# Patient Record
Sex: MALE | Race: WHITE | Employment: FULL TIME | URBAN - METROPOLITAN AREA
[De-identification: names, ages, dates, MRNs, and addresses within clinical notes are randomized per-mention and may not be internally consistent; named-entity substitution may affect disease eponyms.]

---

## 2019-03-05 ENCOUNTER — OFFICE VISIT (OUTPATIENT)
Dept: ORTHOPEDIC SURGERY | Age: 44
End: 2019-03-05
Payer: COMMERCIAL

## 2019-03-05 ENCOUNTER — TELEPHONE (OUTPATIENT)
Dept: ORTHOPEDIC SURGERY | Age: 44
End: 2019-03-05

## 2019-03-05 VITALS
DIASTOLIC BLOOD PRESSURE: 99 MMHG | HEART RATE: 59 BPM | HEIGHT: 72 IN | BODY MASS INDEX: 31.83 KG/M2 | SYSTOLIC BLOOD PRESSURE: 155 MMHG | WEIGHT: 235 LBS

## 2019-03-05 DIAGNOSIS — S69.92XA INJURY OF LEFT THUMB, INITIAL ENCOUNTER: Primary | ICD-10-CM

## 2019-03-05 PROCEDURE — 99203 OFFICE O/P NEW LOW 30 MIN: CPT | Performed by: ORTHOPAEDIC SURGERY

## 2019-03-08 ENCOUNTER — TELEPHONE (OUTPATIENT)
Dept: ORTHOPEDIC SURGERY | Age: 44
End: 2019-03-08

## 2019-03-11 ENCOUNTER — TELEPHONE (OUTPATIENT)
Dept: ORTHOPEDIC SURGERY | Age: 44
End: 2019-03-11

## 2019-03-13 ENCOUNTER — OFFICE VISIT (OUTPATIENT)
Dept: ORTHOPEDIC SURGERY | Age: 44
End: 2019-03-13
Payer: COMMERCIAL

## 2019-03-13 DIAGNOSIS — S63.642A RUPTURE OF ULNAR COLLATERAL LIGAMENT OF LEFT THUMB, INITIAL ENCOUNTER: Primary | ICD-10-CM

## 2019-03-13 DIAGNOSIS — S69.92XA INJURY OF LEFT THUMB, INITIAL ENCOUNTER: ICD-10-CM

## 2019-03-13 DIAGNOSIS — S53.32XA STENER LESION OF LEFT THUMB, INITIAL ENCOUNTER: ICD-10-CM

## 2019-03-13 PROCEDURE — 99213 OFFICE O/P EST LOW 20 MIN: CPT | Performed by: ORTHOPAEDIC SURGERY

## 2019-03-14 RX ORDER — METOPROLOL SUCCINATE 50 MG/1
50 TABLET, EXTENDED RELEASE ORAL DAILY
COMMUNITY

## 2019-03-14 SDOH — HEALTH STABILITY: MENTAL HEALTH: HOW OFTEN DO YOU HAVE A DRINK CONTAINING ALCOHOL?: NEVER

## 2019-03-19 ENCOUNTER — ANESTHESIA EVENT (OUTPATIENT)
Dept: OPERATING ROOM | Age: 44
End: 2019-03-19
Payer: COMMERCIAL

## 2019-03-19 ENCOUNTER — HOSPITAL ENCOUNTER (OUTPATIENT)
Age: 44
Setting detail: OUTPATIENT SURGERY
Discharge: HOME | End: 2019-03-19
Attending: ORTHOPAEDIC SURGERY | Admitting: ORTHOPAEDIC SURGERY
Payer: COMMERCIAL

## 2019-03-19 ENCOUNTER — ANESTHESIA (OUTPATIENT)
Dept: OPERATING ROOM | Age: 44
End: 2019-03-19
Payer: COMMERCIAL

## 2019-03-19 VITALS — OXYGEN SATURATION: 96 % | TEMPERATURE: 98.1 F | SYSTOLIC BLOOD PRESSURE: 97 MMHG | DIASTOLIC BLOOD PRESSURE: 56 MMHG

## 2019-03-19 VITALS
TEMPERATURE: 97.4 F | SYSTOLIC BLOOD PRESSURE: 119 MMHG | DIASTOLIC BLOOD PRESSURE: 89 MMHG | HEIGHT: 72 IN | OXYGEN SATURATION: 93 % | BODY MASS INDEX: 32.51 KG/M2 | RESPIRATION RATE: 14 BRPM | WEIGHT: 240 LBS | HEART RATE: 61 BPM

## 2019-03-19 DIAGNOSIS — S63.642A RUPTURE OF ULNAR COLLATERAL LIGAMENT OF LEFT THUMB, INITIAL ENCOUNTER: Primary | ICD-10-CM

## 2019-03-19 PROCEDURE — 2709999900 HC NON-CHARGEABLE SUPPLY: Performed by: ORTHOPAEDIC SURGERY

## 2019-03-19 PROCEDURE — 7100000011 HC PHASE II RECOVERY - ADDTL 15 MIN: Performed by: ORTHOPAEDIC SURGERY

## 2019-03-19 PROCEDURE — 6360000002 HC RX W HCPCS: Performed by: NURSE ANESTHETIST, CERTIFIED REGISTERED

## 2019-03-19 PROCEDURE — 2500000003 HC RX 250 WO HCPCS: Performed by: NURSE ANESTHETIST, CERTIFIED REGISTERED

## 2019-03-19 PROCEDURE — 2500000003 HC RX 250 WO HCPCS: Performed by: ORTHOPAEDIC SURGERY

## 2019-03-19 PROCEDURE — 7100000000 HC PACU RECOVERY - FIRST 15 MIN: Performed by: ORTHOPAEDIC SURGERY

## 2019-03-19 PROCEDURE — C1776 JOINT DEVICE (IMPLANTABLE): HCPCS | Performed by: ORTHOPAEDIC SURGERY

## 2019-03-19 PROCEDURE — 3600000004 HC SURGERY LEVEL 4 BASE: Performed by: ORTHOPAEDIC SURGERY

## 2019-03-19 PROCEDURE — 6360000002 HC RX W HCPCS: Performed by: ANESTHESIOLOGY

## 2019-03-19 PROCEDURE — 7100000010 HC PHASE II RECOVERY - FIRST 15 MIN: Performed by: ORTHOPAEDIC SURGERY

## 2019-03-19 PROCEDURE — 6370000000 HC RX 637 (ALT 250 FOR IP): Performed by: ORTHOPAEDIC SURGERY

## 2019-03-19 PROCEDURE — 6360000002 HC RX W HCPCS: Performed by: ORTHOPAEDIC SURGERY

## 2019-03-19 PROCEDURE — 7100000001 HC PACU RECOVERY - ADDTL 15 MIN: Performed by: ORTHOPAEDIC SURGERY

## 2019-03-19 PROCEDURE — 3700000001 HC ADD 15 MINUTES (ANESTHESIA): Performed by: ORTHOPAEDIC SURGERY

## 2019-03-19 PROCEDURE — 3600000014 HC SURGERY LEVEL 4 ADDTL 15MIN: Performed by: ORTHOPAEDIC SURGERY

## 2019-03-19 PROCEDURE — 2580000003 HC RX 258: Performed by: NURSE ANESTHETIST, CERTIFIED REGISTERED

## 2019-03-19 PROCEDURE — 3700000000 HC ANESTHESIA ATTENDED CARE: Performed by: ORTHOPAEDIC SURGERY

## 2019-03-19 PROCEDURE — 2580000003 HC RX 258: Performed by: ORTHOPAEDIC SURGERY

## 2019-03-19 PROCEDURE — 6370000000 HC RX 637 (ALT 250 FOR IP): Performed by: ANESTHESIOLOGY

## 2019-03-19 DEVICE — KIT CONVENIENCE HND WRST INTERNALBRACE LIG AUG IMPL REP SYS: Type: IMPLANTABLE DEVICE | Site: HAND | Status: FUNCTIONAL

## 2019-03-19 RX ORDER — SODIUM CHLORIDE 9 MG/ML
INJECTION, SOLUTION INTRAVENOUS CONTINUOUS
Status: DISCONTINUED | OUTPATIENT
Start: 2019-03-19 | End: 2019-03-19 | Stop reason: HOSPADM

## 2019-03-19 RX ORDER — SODIUM CHLORIDE, SODIUM LACTATE, POTASSIUM CHLORIDE, CALCIUM CHLORIDE 600; 310; 30; 20 MG/100ML; MG/100ML; MG/100ML; MG/100ML
INJECTION, SOLUTION INTRAVENOUS CONTINUOUS
Status: DISCONTINUED | OUTPATIENT
Start: 2019-03-19 | End: 2019-03-19 | Stop reason: HOSPADM

## 2019-03-19 RX ORDER — LIDOCAINE HYDROCHLORIDE 10 MG/ML
0.5 INJECTION, SOLUTION EPIDURAL; INFILTRATION; INTRACAUDAL; PERINEURAL ONCE
Status: DISCONTINUED | OUTPATIENT
Start: 2019-03-19 | End: 2019-03-19 | Stop reason: HOSPADM

## 2019-03-19 RX ORDER — GINSENG 100 MG
CAPSULE ORAL
Status: COMPLETED | OUTPATIENT
Start: 2019-03-19 | End: 2019-03-19

## 2019-03-19 RX ORDER — MAGNESIUM HYDROXIDE 1200 MG/15ML
LIQUID ORAL CONTINUOUS PRN
Status: COMPLETED | OUTPATIENT
Start: 2019-03-19 | End: 2019-03-19

## 2019-03-19 RX ORDER — DEXAMETHASONE SODIUM PHOSPHATE 4 MG/ML
INJECTION, SOLUTION INTRA-ARTICULAR; INTRALESIONAL; INTRAMUSCULAR; INTRAVENOUS; SOFT TISSUE PRN
Status: DISCONTINUED | OUTPATIENT
Start: 2019-03-19 | End: 2019-03-19 | Stop reason: SDUPTHER

## 2019-03-19 RX ORDER — ONDANSETRON 2 MG/ML
4 INJECTION INTRAMUSCULAR; INTRAVENOUS
Status: DISCONTINUED | OUTPATIENT
Start: 2019-03-19 | End: 2019-03-19 | Stop reason: HOSPADM

## 2019-03-19 RX ORDER — FENTANYL CITRATE 50 UG/ML
INJECTION, SOLUTION INTRAMUSCULAR; INTRAVENOUS PRN
Status: DISCONTINUED | OUTPATIENT
Start: 2019-03-19 | End: 2019-03-19 | Stop reason: SDUPTHER

## 2019-03-19 RX ORDER — HYDROCODONE BITARTRATE AND ACETAMINOPHEN 5; 325 MG/1; MG/1
1 TABLET ORAL EVERY 6 HOURS PRN
Qty: 15 TABLET | Refills: 0 | Status: SHIPPED | OUTPATIENT
Start: 2019-03-19 | End: 2019-03-24

## 2019-03-19 RX ORDER — LIDOCAINE HYDROCHLORIDE 10 MG/ML
1 INJECTION, SOLUTION EPIDURAL; INFILTRATION; INTRACAUDAL; PERINEURAL
Status: DISCONTINUED | OUTPATIENT
Start: 2019-03-19 | End: 2019-03-19 | Stop reason: HOSPADM

## 2019-03-19 RX ORDER — HYDROMORPHONE HCL 110MG/55ML
0.5 PATIENT CONTROLLED ANALGESIA SYRINGE INTRAVENOUS EVERY 5 MIN PRN
Status: DISCONTINUED | OUTPATIENT
Start: 2019-03-19 | End: 2019-03-19 | Stop reason: HOSPADM

## 2019-03-19 RX ORDER — PROPOFOL 10 MG/ML
INJECTION, EMULSION INTRAVENOUS PRN
Status: DISCONTINUED | OUTPATIENT
Start: 2019-03-19 | End: 2019-03-19 | Stop reason: SDUPTHER

## 2019-03-19 RX ORDER — ONDANSETRON 2 MG/ML
INJECTION INTRAMUSCULAR; INTRAVENOUS PRN
Status: DISCONTINUED | OUTPATIENT
Start: 2019-03-19 | End: 2019-03-19 | Stop reason: SDUPTHER

## 2019-03-19 RX ORDER — HYDROCODONE BITARTRATE AND ACETAMINOPHEN 5; 325 MG/1; MG/1
1 TABLET ORAL
Status: COMPLETED | OUTPATIENT
Start: 2019-03-19 | End: 2019-03-19

## 2019-03-19 RX ORDER — EPHEDRINE SULFATE 50 MG/ML
INJECTION INTRAVENOUS PRN
Status: DISCONTINUED | OUTPATIENT
Start: 2019-03-19 | End: 2019-03-19 | Stop reason: SDUPTHER

## 2019-03-19 RX ORDER — MIDAZOLAM HYDROCHLORIDE 1 MG/ML
INJECTION INTRAMUSCULAR; INTRAVENOUS PRN
Status: DISCONTINUED | OUTPATIENT
Start: 2019-03-19 | End: 2019-03-19 | Stop reason: SDUPTHER

## 2019-03-19 RX ORDER — SODIUM CHLORIDE, SODIUM LACTATE, POTASSIUM CHLORIDE, CALCIUM CHLORIDE 600; 310; 30; 20 MG/100ML; MG/100ML; MG/100ML; MG/100ML
INJECTION, SOLUTION INTRAVENOUS CONTINUOUS PRN
Status: DISCONTINUED | OUTPATIENT
Start: 2019-03-19 | End: 2019-03-19 | Stop reason: SDUPTHER

## 2019-03-19 RX ORDER — HYDROMORPHONE HCL 110MG/55ML
0.25 PATIENT CONTROLLED ANALGESIA SYRINGE INTRAVENOUS EVERY 5 MIN PRN
Status: DISCONTINUED | OUTPATIENT
Start: 2019-03-19 | End: 2019-03-19 | Stop reason: HOSPADM

## 2019-03-19 RX ORDER — LIDOCAINE HYDROCHLORIDE 20 MG/ML
INJECTION, SOLUTION EPIDURAL; INFILTRATION; INTRACAUDAL; PERINEURAL PRN
Status: DISCONTINUED | OUTPATIENT
Start: 2019-03-19 | End: 2019-03-19 | Stop reason: SDUPTHER

## 2019-03-19 RX ORDER — CEFAZOLIN SODIUM 2 G/100ML
2 INJECTION, SOLUTION INTRAVENOUS
Status: COMPLETED | OUTPATIENT
Start: 2019-03-19 | End: 2019-03-19

## 2019-03-19 RX ORDER — BUPIVACAINE HYDROCHLORIDE 2.5 MG/ML
INJECTION, SOLUTION EPIDURAL; INFILTRATION; INTRACAUDAL
Status: COMPLETED | OUTPATIENT
Start: 2019-03-19 | End: 2019-03-19

## 2019-03-19 RX ADMIN — PROPOFOL 200 MG: 10 INJECTION, EMULSION INTRAVENOUS at 14:52

## 2019-03-19 RX ADMIN — SODIUM CHLORIDE, POTASSIUM CHLORIDE, SODIUM LACTATE AND CALCIUM CHLORIDE: 600; 310; 30; 20 INJECTION, SOLUTION INTRAVENOUS at 13:32

## 2019-03-19 RX ADMIN — LIDOCAINE HYDROCHLORIDE 100 MG: 20 INJECTION, SOLUTION EPIDURAL; INFILTRATION; INTRACAUDAL; PERINEURAL at 14:50

## 2019-03-19 RX ADMIN — MIDAZOLAM HYDROCHLORIDE 2 MG: 1 INJECTION, SOLUTION INTRAMUSCULAR; INTRAVENOUS at 14:44

## 2019-03-19 RX ADMIN — CEFAZOLIN SODIUM 2 G: 2 INJECTION, SOLUTION INTRAVENOUS at 14:43

## 2019-03-19 RX ADMIN — EPHEDRINE SULFATE 10 MG: 50 INJECTION, SOLUTION INTRAVENOUS at 15:28

## 2019-03-19 RX ADMIN — FENTANYL CITRATE 25 MCG: 50 INJECTION, SOLUTION INTRAMUSCULAR; INTRAVENOUS at 15:12

## 2019-03-19 RX ADMIN — HYDROCODONE BITARTRATE AND ACETAMINOPHEN 1 TABLET: 5; 325 TABLET ORAL at 17:30

## 2019-03-19 RX ADMIN — SODIUM CHLORIDE, POTASSIUM CHLORIDE, SODIUM LACTATE AND CALCIUM CHLORIDE: 600; 310; 30; 20 INJECTION, SOLUTION INTRAVENOUS at 14:16

## 2019-03-19 RX ADMIN — HYDROMORPHONE HYDROCHLORIDE 0.5 MG: 2 INJECTION, SOLUTION INTRAMUSCULAR; INTRAVENOUS; SUBCUTANEOUS at 17:11

## 2019-03-19 RX ADMIN — HYDROMORPHONE HYDROCHLORIDE 0.5 MG: 2 INJECTION, SOLUTION INTRAMUSCULAR; INTRAVENOUS; SUBCUTANEOUS at 17:00

## 2019-03-19 RX ADMIN — ONDANSETRON 4 MG: 2 INJECTION INTRAMUSCULAR; INTRAVENOUS at 15:00

## 2019-03-19 RX ADMIN — DEXAMETHASONE SODIUM PHOSPHATE 8 MG: 4 INJECTION, SOLUTION INTRAMUSCULAR; INTRAVENOUS at 14:58

## 2019-03-19 ASSESSMENT — PULMONARY FUNCTION TESTS
PIF_VALUE: 4
PIF_VALUE: 3
PIF_VALUE: 4
PIF_VALUE: 2
PIF_VALUE: 4
PIF_VALUE: 5
PIF_VALUE: 4
PIF_VALUE: 4
PIF_VALUE: 8
PIF_VALUE: 0
PIF_VALUE: 4
PIF_VALUE: 1
PIF_VALUE: 4
PIF_VALUE: 21
PIF_VALUE: 4
PIF_VALUE: 5
PIF_VALUE: 4
PIF_VALUE: 4
PIF_VALUE: 2
PIF_VALUE: 4
PIF_VALUE: 2
PIF_VALUE: 4
PIF_VALUE: 2
PIF_VALUE: 4
PIF_VALUE: 3
PIF_VALUE: 16
PIF_VALUE: 1
PIF_VALUE: 4
PIF_VALUE: 18
PIF_VALUE: 17
PIF_VALUE: 1
PIF_VALUE: 3
PIF_VALUE: 4
PIF_VALUE: 1
PIF_VALUE: 4
PIF_VALUE: 3
PIF_VALUE: 3
PIF_VALUE: 4
PIF_VALUE: 3
PIF_VALUE: 6
PIF_VALUE: 4
PIF_VALUE: 4
PIF_VALUE: 3
PIF_VALUE: 4
PIF_VALUE: 2
PIF_VALUE: 4
PIF_VALUE: 5
PIF_VALUE: 1
PIF_VALUE: 4
PIF_VALUE: 3
PIF_VALUE: 4
PIF_VALUE: 5
PIF_VALUE: 4
PIF_VALUE: 1
PIF_VALUE: 0
PIF_VALUE: 4
PIF_VALUE: 5
PIF_VALUE: 5
PIF_VALUE: 10
PIF_VALUE: 2
PIF_VALUE: 4
PIF_VALUE: 5
PIF_VALUE: 4

## 2019-03-19 ASSESSMENT — PAIN - FUNCTIONAL ASSESSMENT: PAIN_FUNCTIONAL_ASSESSMENT: 0-10

## 2019-03-19 ASSESSMENT — PAIN SCALES - GENERAL
PAINLEVEL_OUTOF10: 5
PAINLEVEL_OUTOF10: 7
PAINLEVEL_OUTOF10: 7

## 2019-04-01 ENCOUNTER — TELEPHONE (OUTPATIENT)
Dept: ORTHOPEDIC SURGERY | Age: 44
End: 2019-04-01

## 2019-04-03 ENCOUNTER — HOSPITAL ENCOUNTER (OUTPATIENT)
Dept: OCCUPATIONAL THERAPY | Age: 44
Setting detail: THERAPIES SERIES
Discharge: HOME | End: 2019-04-03
Payer: COMMERCIAL

## 2019-04-03 ENCOUNTER — OFFICE VISIT (OUTPATIENT)
Dept: ORTHOPEDIC SURGERY | Age: 44
End: 2019-04-03

## 2019-04-03 DIAGNOSIS — S53.32XA STENER LESION OF LEFT THUMB, INITIAL ENCOUNTER: Primary | ICD-10-CM

## 2019-04-03 DIAGNOSIS — S63.642A RUPTURE OF ULNAR COLLATERAL LIGAMENT OF LEFT THUMB, INITIAL ENCOUNTER: ICD-10-CM

## 2019-04-03 PROCEDURE — 99024 POSTOP FOLLOW-UP VISIT: CPT | Performed by: ORTHOPAEDIC SURGERY

## 2019-04-03 PROCEDURE — L3808 WHFO, RIGID W/O JOINTS: HCPCS | Performed by: OCCUPATIONAL THERAPIST

## 2019-04-03 NOTE — PLAN OF CARE
[]Left  Occupational/Vocational Status:   Progress of any previous OT/PT: the patient []has/ [x]has not received OT/PT previously for this diagnosis. Pain: 2/10    Objective Findings:  Stitches removed today at MD appointment  Type of splint:  L thumb spica FA based  Splint protocol utilization:  At all times except when cleansing skin  Splint Purpose: [x]Immobilize or protect [x]Promote healing of ligament   [x]Relieve pain  []Provide support for improved hand function []Maximize joint motion    Treatment:   [x]Splint provided ([x]Customized/ []Prefabricated), and splint rationale explained. [x]Patient instructed in [x]wear/ [x]care of splint and educated regarding diagnosis. [x]Patient instructed in symptom reduction techniques   []HEP instruction    []Discussed ADL assistive device    Written Information Distributed: []HEP  [x]Splint care and wearing protocol    Patient response to evaluation and instructions:  [x]Attentive/interested   [x]Asked questions/ retained info  []Appeared disinterested  []Poor retention of information  []Appeared anxious/ fearful    Assessment and Plan:  Goals: [x]Patient will be able to verbalize rationale for, and demonstrate proper wearing     of splint. [x]Splint will provide proper fit and function. [x]Patient will be able to verbalize 2-3 ways to prevent further symptoms. [x]Patient will be able to don and doff independently. []Patient will be independent with HEP    Goals met:  [x]yes []no    Plan:  []Splint completed with good fit and function. Hand Therapy to follow up for     splint modifications as needed    [x]Splint completed; OT/PT evaluation initiated. Patient to return for further     treatment.     Tobin Scott  OTR/L [address] [facility]  [place] [id number]
Anxious

## 2019-04-03 NOTE — PROGRESS NOTES
Chief Complaint:  Follow-up (left thumb)      History of Present of Illness:  Mr. Yuly Boone is a 58-year-old male presenting his 1st postoperative visit after left thumb surgery  Procedure: Repair of left thumb MP joint ulnar collateral ligament  Date of procedure: 3/19/2019  He is now 2 weeks status post surgery  Overall his thumb pain has been minimal, he has been in a dressing since his injury  Unfortunately he did have a skin reaction about 3 days after his surgery. He noticed a significant amount of itching and skin reaction. He was given a course of oral prednisone at an urgent care clinic in over the last several days he has noticed overall improvement. He denies any chest pain or difficulty with breathing. No fever chills or other constitutional symptoms  Examination:  General: Pleasant, well-appearing, no acute distress  Left upper extremity:  There is some excoriation and dried skin throughout the forearm down to the wrist with a rash appearing diffusely without evidence of obvious open wounds, no fluctuance and no induration  Left thumb wound appears to be well healed with sutures in place, well approximated wound  Normal thumb tenodesis and cascade with active thumb EPL and FPL with overall stiffness throughout thumb MP and IP joint  Sensation grossly intact throughout the radial and ulnar aspect of thumb including on the dorsal ulnar aspect of thumb  Capillary refill brisk in thumb tip      Radiology:     X-rays obtained and reviewed in office:  Views 3  Location left thumb  Impression appropriate alignment of thumb MP joint with no evidence of subluxation  No fracture with cortical tunnels within metacarpal head and base of proximal phalanx in appropriate position    Orders Placed This Encounter   Procedures    XR FINGER LEFT (MIN 2 VIEWS)       Impression:  Encounter Diagnoses   Name Primary?     Stener lesion of left thumb, initial encounter Yes    Rupture of ulnar collateral ligament of left thumb, initial encounter          Treatment Plan:  's patient is now 2 weeks status post surgery  We will plan to remove sutures today and apply Steri-Strips noted okay for normal hand hygiene starting in 24 hours  OT referral for fabrication of forearm based thumb spica with IP joint free  We will initiate active range of motion of thumb and wrist with flexion and extension avoiding any lateral pinch or stress of ulnar collateral ligament at this time.   I did discuss with the patient removing the splint for hygiene as well as for exercises but to keep on otherwise for protection  We will plan to see him back in 3-4 weeks for repeat evaluation as he progresses his range of motion with therapy, no strengthening formally until follow-up

## 2019-04-03 NOTE — LETTER
[facility]  [address] [address]  Phone: [phone]  Fax: [phone]    Adriana Scott MD        April 3, 2019     Patient: Moose Chavez   YOB: 1975   Date of Visit: 4/3/2019       To Whom It May Concern: It is my medical opinion that Hope Coles may return to light duty immediately with the following restrictions: no work involving left hand. Restrictions are in effect for 4 weeks. If you have any questions or concerns, please don't hesitate to call.     Sincerely,      Vimal Vazquez MD

## 2019-04-10 ENCOUNTER — HOSPITAL ENCOUNTER (OUTPATIENT)
Dept: OCCUPATIONAL THERAPY | Age: 44
Setting detail: THERAPIES SERIES
Discharge: HOME | End: 2019-04-10
Payer: COMMERCIAL

## 2019-04-10 PROCEDURE — 97140 MANUAL THERAPY 1/> REGIONS: CPT | Performed by: OCCUPATIONAL THERAPIST

## 2019-04-10 PROCEDURE — 97165 OT EVAL LOW COMPLEX 30 MIN: CPT | Performed by: OCCUPATIONAL THERAPIST

## 2019-04-10 PROCEDURE — 97110 THERAPEUTIC EXERCISES: CPT | Performed by: OCCUPATIONAL THERAPIST

## 2019-04-10 NOTE — FLOWSHEET NOTE
[facility], [address] [address]  Phone [phone]      Fax [phone]    Hand Therapy Daily Treatment Note  Date:  4/10/2019    Patient: Dominik Pat   : 1975   MRN: [id number]  Referring Physician: Referring Practitioner: Yane Mitchell       Medical Diagnosis Information:  Diagnosis: L thumb UCL rupture  E31.478H    L thumb Stener lesion S53. 32XA    Treatment Diagnosis: L thumb/hand stiffness M25.642                                         Insurance information: OT Insurance Information: [facility]   16 visits  Date of Injury: 19  Date of Surgery: 3/19/19      Visit # Insurance Allowable   1 16   19     Date of Patient follow up with Physician:    Afshin:  OT G-marcus  Functional Assessment Tool Used: Quick DASH   Score: 52%    Progress Note: []  Yes  [x]  No  Next due by: Visit #10      Latex Allergy:  [x]NO      []YES    Preferred Language for Healthcare:   [x]English       []other:    Pain level:  2/10     SUBJECTIVE:    History of Injury/ Mechanism of Injury: 19  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 Manual Scar massage    Retrograde massage                                                                   Therapeutic Exercise and NMR:  [x] (45549) Provided verbal/tactile cueing for activities related to strengthening, flexibility, endurance, ROM  for improvements in scapular, scapulothoracic and UE control with self care, reaching, carrying, lifting, house/yardwork, driving/computer work.     [x] Splinting:  [] Fabrication of:   [] (06703) Checkout for orthotic/prosthetic use, established patient   [] (78709) Orthotic management and training (fitting and assessment)  [] Comments:    Charges:  Timed Code Treatment Minutes: 25   Total Treatment Minutes: 50     [x] EVAL (LOW) 61210   [] OT Re-eval (39343)  [] EVAL (MOD) 49177   [] EVAL (HIGH) 54353       [x] Stephen (51421) x  1   [] DRBYQ(58274)  [] NMR (73750) x      [] Estim (attended) (54395)   [x] Manual ([id number]) x  1    [] US (66284)  [] TA (91061) x      [] Paraffin (38314)  [] ADL  (26755) x     [] Splint/L code:    [] Estim (unattended) [phone] 93 31)  [] Other:      Frequency/Duration:  1-2 days per week for 8 weeks         GOALS:  Short Term Goals: To be achieved in: 2 weeks  1. Independent in HEP and progression per patient tolerance, in order to prevent re-injury. 2. Patient will have a decrease in pain to facilitate improvement in movement, function, and ADLs as indicated by Functional Deficits.     Long Term Goals to be achieved in 8  weeks, including patient directed goals to address identified performance deficits:  1) Pt to be independent in graded HEP progression with a good level of effort and compliance. 2) Pt to report a score of 52 % or less on the Quick DASH disability questionnaire for increased performance with carrying, moving, and handling objects. 3) Pt will demonstrate increased ROM to L th tip to [facility] </= 2 cm for improved performance with fastening, opening containers,. 4) Pt will demonstrate increased strength to L  within 12# of R and 3 pt pinch within 5# of R for improved performance with opening containers, lifting, and performing job requirements. 5) Pt will have a decrease in pain to 2/1 with moderate resistive tasks to facilitate improvement in performance ADL and work tasks.         Progression Towards Functional goals:  [] Patient is progressing as expected towards functional goals listed.     [] Progression is slowed due to complexities listed. [] Progression has been slowed due to co-morbidities. [x] Plan just implemented, too soon to assess goals progression  [] Other:     ASSESSMENT:  See eval    Treatment/Activity Tolerance:  [] Patient tolerated treatment well [] Patient limited by fatique  [x] Patient limited by pain  [] Patient limited by other medical complications  [] Other:     Prognosis: [x] Good [] Fair  [] Poor    Patient Requires Follow-up: [x] Yes  [] No    PLAN: See eval  [] Continue per plan of care [] Alter current plan (see comments)  [x] Plan of care initiated [] Hold pending MD visit [] Discharge    Electronically signed by:  Elías Hammond OTR/L [address] [place] [address]  [place] [id number]

## 2019-04-10 NOTE — PLAN OF CARE
[address] [place] [address] [place] [address]  Phone [phone]      Fax [phone]      Occupational Therapy/Hand Therapy Certification  Dear Referring Practitioner: Andrew Jones,     We had the pleasure of evaluating the following patient for occupational therapy services at [address] [place] [address]. A summary of our findings can be found in the initial assessment below. This includes our plan of care. If you have any questions or concerns regarding these findings, please do not hesitate to contact me at the office phone number checked above. Thank you for the referral.     Physician Signature:_______________________________Date:__________________  By signing above (or electronic signature), therapists plan is approved by physician      Patient: Hermila Vasquez   : 1975   MRN: [id number]  Referring Physician: Referring Practitioner: Andrew Jonse      Evaluation Date: 4/10/2019      Medical Diagnosis Information:  Diagnosis: L thumb UCL rupture  P44.746M    L thumb Stener lesion S53. 32XA    Treatment Diagnosis: L thumb/hand stiffness M25.642                  Insurance information: OT Insurance Information: [address] [place] [address]   16 visits                                                                                               4/3/19-19  Date of Injury: 19  Date of Surgery: 3/19/19      Precautions/ Contra-indications:   Latex Allergy:  [x]No      []Yes  Pacemaker:  [x] No       [] Yes     Preferred Language for Healthcare:   [x]English       []other:      G-Codes:  OT G-codes  Functional Assessment Tool Used: Quick DASH   Score: 52%    [x] Patient reported history, allergies, and medications reviewed - see intake form.      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    Prior Level of Function: [x] Independent with ADLs/IADLs     [] Assistance needed (describe):    Patient-Identified Primary Performance Deficits (to be addressed in POC):   [] bathing    [] household tasks (cooking/cleaning)   [x] dressing - fastening    [] self feeding   [] grooming    [x] work/education- light duty   [] functional mobility   [] sleeping/rest   [] toileting/hygiene   [] recreational activities   [] driving    [] community/social participation   [x] other: cutting food, opening containers, lifting      Comorbidities Affecting Functional Performance:     []Anxiety (F41.9)/Depression (F32.9)   []Diabetes Type 1(E10.65) or 2 (E11.65)   []Rheumatoid Arthritis (M05.9)  []Fibromyalgia (M79.7)  []Neuropathy(G60.9)  []Osteoarthritis(M19.91)  []None   [x]Other:HTN    Hand Dominance:   [x]  Right    [] Left      OBJECTIVE:        AROM: Right Left   IF MP  PIP  DIP       LF MP  PIP  DIP       RF MP  PIP   DIP       SF MP  PIP  DIP       Digits: tips to DPFC           Thumb MP  IP  /35  /20   Thumb tip to DPFC  6 cm   Thumb opposition  IF, LF   Thumb RA               PA  60   Wrist Ext/Flex            RD/UD  60/70   Forearm sup/pron       Elbow ext/flex       Shoulder Flex                   Abd                   IR/ER          Edema:       Mild to mod        Strength:  deferred    II     Lateral Pinch     3 Point Pinch     Tip Pinch     MMT:            Observations (including splints, bandages, incisions, scars): scar well healed     Sensation: [] No reported deficits  [] Intact to light touch    [] Orrington Heather test completed, findings as noted:  [x] Other:Reports decreased acuteness of sensation    Palpation: unremarkable    Functional Mobility/Transfers/Gait: [x] Independent - no significant gait deviations  [] Assistance needed   [] Assistive device used: Falls Risk Assessment (30 days):   [x] Falls Risk assessed and no intervention required. [] Falls Risk assessed and Patient requires intervention due to being higher risk   TUG score (>12s at risk):     [] Falls education provided, including      Review Of Systems (ROS): [x]Performed Review of systems (Integumentary, CardioPulmonary, Neurological) by intake and observation. Intake form has been scanned into medical record. Patient has been instructed to contact their primary care physician regarding ROS issues if not already being addressed at this time. ASSESSMENT:   This patient presents with signs and symptoms consistent with the medical diagnosis provided by the referring physician. Impairments (physical, cognitive and/or psychosocial):  [x] Decreased mobility   [x] Weakness    [] Hypersensitivity   [x] Pain/tenderness   [] Edema/swelling   [x] Decreased coordination (fine/gross motor)   [] Impaired body mechanics  [] Sensory loss  [] Loss of balance   [] Other:      Performance Deficits (to be addressed in plan of care):   [] Bathing    [] Household Tasks (cooking/cleaning)   [x] Dressing - fastenings    [] Self Feeding   [] Grooming    [x] Work/Education -on light duty   [] Functional Mobility   [] Sleeping/Rest   [] Toileting/Hygiene   [] Recreational Activities   [] Driving    [] Community/Social Participation   [x] Other:cutting food, opening containers, lifting     Rehab Potential:   [] Excellent [x] Good [] Fair  [] Poor     Barriers affecting rehab potential:  []Age    []Lack of Motivation   []Co-Morbidities  []Cognitive Function  []Environmental/home/work barriers  []Other:     Tolerance of evaluation/treatment:    [] Excellent [x] Good [] Fair  [] Poor      PLAN OF CARE:  Interventions:   [x] Therapeutic Exercise [x] Therapeutic Activity    [x] Activities of Daily Living [x] Neuromuscular Re-education      [x] Patient Education  [x] Manual Therapy      [x] Modalities as needed, and not otherwise contraindicated, including: ultrasound,paraffin,moist heat/cold pack, electrical stimulation, contrast bath, iontophoresis  [] Splinting    Frequency/Duration:  1-2 days per week for 8 weeks       GOALS:  Short Term Goals: To be achieved in: 2 weeks  1. Independent in HEP and progression per patient tolerance, in order to prevent re-injury. 2. Patient will have a decrease in pain to facilitate improvement in movement, function, and ADLs as indicated by Functional Deficits. Long Term Goals to be achieved in 8  weeks, including patient directed goals to address identified performance deficits:  1) Pt to be independent in graded HEP progression with a good level of effort and compliance. 2) Pt to report a score of 52 % or less on the Quick DASH disability questionnaire for increased performance with carrying, moving, and handling objects. 3) Pt will demonstrate increased ROM to L th tip to [facility] </= 2 cm for improved performance with fastening, opening containers,. 4) Pt will demonstrate increased strength to L  within 12# of R and 3 pt pinch within 5# of R for improved performance with opening containers, lifting, and performing job requirements. 5) Pt will have a decrease in pain to 2/1 with moderate resistive tasks to facilitate improvement in performance ADL and work tasks.       OCCUPATIONAL THERAPY EVALUATION COMPLEXITY JUSTIFICATION:    [] An occupational profile and medical/therapy history, which includes:   [x] a brief history including medical and/or therapy records relating to the     presenting problem   [] an expanded review of medical and/or therapy records and additional review     of physical, cognitive or psychosocial history related to current functional    performance   [] an extensive additional review of review of medical and/or therapy records   and physical, cognitive, or psychosocial history related to current    functional performance    [] An assessment that identifies performance deficits (relating to physical, cognitive, or psychosocial skills) that result in activity limitations and/or participation restrictions:   [] 1-3 performance deficits   [x] 3-5 performance deficits   [] 5 or more performance deficits    [] Clinical decision making of:   [x] low complexity, including analysis of occupational profile, data analysis from problem focused assessment, and consideration of a limited number of treatment options. No comorbidities affect occupational performance. No task modifications or assistance needed to complete evaluation. [] moderate complexity, including analysis of occupational profile, data analysis from detailed assessment and consideration of several treatment options. Comorbidities that affect occupational performance may be present. Minimal to moderate task modifications or assistance needed to complete assessment. [] high complexity, including analysis of occupational profile, analysis of data from comprehensive assessment and consideration of multiple treatment options. Multiple comorbidities present that affect occupational performance. Significant task modifications or assistance needed to complete assessment. Evaluation Code:  [x] Low Complexity EVAL 91137 (typically 30 minutes face to face)  [] Mod Complexity EVAL 62629 (typically 45 minutes face to face)  [] High Complexity EVAL 98761 (typically 60 minutes face to face)             Electronically signed by:   Toibn Scott OTR/L [address] [place] [address]  [place] [id number]

## 2019-04-17 ENCOUNTER — HOSPITAL ENCOUNTER (OUTPATIENT)
Dept: OCCUPATIONAL THERAPY | Age: 44
Setting detail: THERAPIES SERIES
Discharge: HOME | End: 2019-04-17
Payer: COMMERCIAL

## 2019-04-17 ENCOUNTER — OFFICE VISIT (OUTPATIENT)
Dept: ORTHOPEDIC SURGERY | Age: 44
End: 2019-04-17

## 2019-04-17 DIAGNOSIS — S63.642A RUPTURE OF ULNAR COLLATERAL LIGAMENT OF LEFT THUMB, INITIAL ENCOUNTER: Primary | ICD-10-CM

## 2019-04-17 DIAGNOSIS — S53.32XA STENER LESION OF LEFT THUMB, INITIAL ENCOUNTER: ICD-10-CM

## 2019-04-17 PROCEDURE — 97110 THERAPEUTIC EXERCISES: CPT | Performed by: OCCUPATIONAL THERAPIST

## 2019-04-17 PROCEDURE — 97140 MANUAL THERAPY 1/> REGIONS: CPT | Performed by: OCCUPATIONAL THERAPIST

## 2019-04-17 PROCEDURE — 97022 WHIRLPOOL THERAPY: CPT | Performed by: OCCUPATIONAL THERAPIST

## 2019-04-17 PROCEDURE — 99024 POSTOP FOLLOW-UP VISIT: CPT | Performed by: ORTHOPAEDIC SURGERY

## 2019-04-17 NOTE — PROGRESS NOTES
Chief Complaint:  Hand Pain (L HAND )      History of Present of Illness:  Mr. Jose Siddiqi is a 45-year-old male presenting his repeat scheduled postoperative visit after left thumb surgery  Procedure: Repair of left thumb MP joint ulnar collateral ligament  Date of procedure: 3/19/2019  He is now proximally 1 month status post surgery  He reports to be doing well, making progress with his thumb MP and IP joint range of motion  He does have some soreness in his thumb after occupational therapy and a small patch of numbness near the dorsal ulnar aspect of the thumb that has been minimally noticeable  No new swelling otherwise in no other complaints today      Examination:  General: Pleasant, well-appearing, no acute distress  Left upper extremity:  Well-healed incision with mobile and soft scar, no evidence of surrounding skin changes or erythema    Normal thumb tenodesis and cascade with active thumb EPL and FPL with overall stiffness in all planes compared with contralateral thumb but overall MP and IP joint remained range of motion smooth with no crepitus  Sensation grossly intact throughout the radial and ulnar aspect of thumb except for a small area approximately 1 cm just distal to the incision at the dorsal ulnar aspect of thumb  Capillary refill brisk in thumb tip        Radiology:     X-rays obtained and reviewed in office:  No new images obtained today    No orders of the defined types were placed in this encounter. Impression:  Encounter Diagnoses   Name Primary?  Rupture of ulnar collateral ligament of left thumb, initial encounter Yes    Stener lesion of left thumb, initial encounter          Treatment Plan:  The patient appears to be healing well.  Continue with therapy for range of motion, initiate some light pinching and gripping starting in 2 weeks  He will continue to wear his brace when out and about for an additional 2 weeks for protection and then may wean from the brace thereafter  Only strengthening guided by therapy until follow-up in approximately one month  We did discuss monitoring his area of altered sensation and I do expect this to improve but we did discuss likely neuropraxia with a small area of numbness that the patient only notices when applying scar massage

## 2019-04-17 NOTE — FLOWSHEET NOTE
[facility], [address] [address]  Phone [phone]      Fax [phone]    Hand Therapy Daily Treatment Note  Date:  2019    Patient: Nikolas Rowell   : 1975   MRN: [id number]  Referring Physician: Referring Practitioner: Glendy Rhoades       Medical Diagnosis Information:  Diagnosis: L thumb UCL rupture  M96.520B    L thumb Stener lesion S53. 32XA    Treatment Diagnosis: L thumb/hand stiffness M25.642                                         Insurance information: OT Insurance Information: [facility]   16 visits  Date of Injury: 19  Date of Surgery: 3/19/19      Visit # Insurance Allowable   2 16   19     Date of Patient follow up with Physician:  1 month    G-Codes:  OT G-codes  Functional Assessment Tool Used: Quick DASH   Score: 52%    Progress Note: []  Yes  [x]  No  Next due by: Visit #10      Latex Allergy:  [x]NO      []YES    Preferred Language for Healthcare:   [x]English       []other:    Pain level:  2/10 Current:3/10 mainly with exer    SUBJECTIVE:  Pain just when he exercises. Now able to oppose ring and small fingers        Frequency/Duration:  1-2 days per week for 8 weeks         GOALS:  Short Term Goals: To be achieved in: 2 weeks  1. Independent in HEP and progression per patient tolerance, in order to prevent re-injury. 2. Patient will have a decrease in pain to facilitate improvement in movement, function, and ADLs as indicated by Functional Deficits.     Long Term Goals to be achieved in 8  weeks, including patient directed goals to address identified performance deficits:  1) Pt to be independent in graded HEP progression with a good level of effort and compliance. 2) Pt to report a score of 52 % or less on the Quick DASH disability questionnaire for increased performance with carrying, moving, and handling objects.   3) Pt will demonstrate increased ROM to L th tip to balance, coordination, kinesthetic sense, posture, motor skill, proprioception  to assist with  scapular, scapulothoracic and UE control with self care, reaching, carrying, lifting, house/yardwork, driving/computer work.   [] Comments:    Therapeutic Activities:    [] (78177 or 90005) Provided verbal/tactile cueing for activities related to improving balance, coordination, kinesthetic sense, posture, motor skill, proprioception and motor activation to allow for proper function of scapular, scapulothoracic and UE control with self care, carrying, lifting, driving/computer work  [] Comments:    Home Exercise Program:    [x] (93713) Reviewed/Progressed HEP activities related to strengthening, flexibility, endurance, ROM of scapular, scapulothoracic and UE control with self care, reaching, carrying, lifting, house/yardwork, driving/computer work  [] (76571) Reviewed/Progressed HEP activities related to improving balance, coordination, kinesthetic sense, posture, motor skill, proprioception of scapular, scapulothoracic and UE control with self care, reaching, carrying, lifting, house/yardwork, driving/computer work    [x] Comments: handouts    Manual Treatments:  PROM / STM / Oscillations-Mobs:  G-I, II, III, IV (PA's, Inf., Post.)  [x] (22850) Provided manual therapy to mobilize soft tissue/joints of cervical/CT, scapular GHJ and UE for the purpose of modulating pain, promoting relaxation,  increasing ROM, reducing/eliminating soft tissue swelling/inflammation/restriction, improving soft tissue extensibility and allowing for proper ROM for normal function with self care, reaching, carrying, lifting, house/yardwork, driving/computer work  [] Comments:    ADL Training:  []  (42847) Provided self-care/home management training related to activities of daily living and compensatory training, and/or use of adaptive equipment   [] Comments:     Splinting:  [] Fabrication of:   [] (58929) Checkout for orthotic/prosthetic use, established patient   [] (50486) Orthotic management and training (fitting and assessment)  [] Comments:    Charges:  Timed Code Treatment Minutes: 45   Total Treatment Minutes: 60     [] EVAL (LOW) 24634   [] OT Re-eval (01540)  [] EVAL (MOD) 94689   [] EVAL (HIGH) 27886       [x] Stephen (78046) x  2   [] PDBLG(47251)  [] NMR (96295) x      [] Estim (attended) (38782)   [x] Manual ([id number]) x  1    [] US (21599)  [] TA (04569) x      [] Paraffin (70691)  [] ADL  ([phone]) x     [] Splint/L code:    [] Estim (unattended) 33 93 31)  [x] Other:fluidotherapy    Frequency/Duration:  1-2 days per week for 8 weeks         GOALS:  Short Term Goals: To be achieved in: 2 weeks  1. Independent in HEP and progression per patient tolerance, in order to prevent re-injury. 2. Patient will have a decrease in pain to facilitate improvement in movement, function, and ADLs as indicated by Functional Deficits.     Long Term Goals to be achieved in 8  weeks, including patient directed goals to address identified performance deficits:  1) Pt to be independent in graded HEP progression with a good level of effort and compliance. 2) Pt to report a score of 52 % or less on the Quick DASH disability questionnaire for increased performance with carrying, moving, and handling objects. 3) Pt will demonstrate increased ROM to L th tip to [facility] </= 2 cm for improved performance with fastening, opening containers,. 4) Pt will demonstrate increased strength to L  within 12# of R and 3 pt pinch within 5# of R for improved performance with opening containers, lifting, and performing job requirements. 5) Pt will have a decrease in pain to 2/1 with moderate resistive tasks to facilitate improvement in performance ADL and work tasks.         Progression Towards Functional goals:  [] Patient is progressing as expected towards functional goals listed. [] Progression is slowed due to complexities listed.   [] Progression has been slowed due to co-morbidities. [x] Plan just implemented, too soon to assess goals progression  [] Other:     ASSESSMENT:  See eval    Treatment/Activity Tolerance:  [] Patient tolerated treatment well [] Patient limited by fatique  [] Patient limited by pain  [] Patient limited by other medical complications  [] Other:     Prognosis: [x] Good [] Fair  [] Poor    Patient Requires Follow-up: [x] Yes  [] No    PLAN: Progress per protocol and MD recomendations  [x] Continue per plan of care [] Alter current plan (see comments)  [] Plan of care initiated [] Hold pending MD visit [] Discharge    Electronically signed by:  Leonel Zapata OTR/L [place]  OT [id number]

## 2019-04-24 ENCOUNTER — HOSPITAL ENCOUNTER (OUTPATIENT)
Dept: OCCUPATIONAL THERAPY | Age: 44
Setting detail: THERAPIES SERIES
Discharge: HOME | End: 2019-04-24
Payer: COMMERCIAL

## 2019-04-24 PROCEDURE — 97763 ORTHC/PROSTC MGMT SBSQ ENC: CPT | Performed by: OCCUPATIONAL THERAPIST

## 2019-04-24 PROCEDURE — 97140 MANUAL THERAPY 1/> REGIONS: CPT | Performed by: OCCUPATIONAL THERAPIST

## 2019-04-24 PROCEDURE — 97110 THERAPEUTIC EXERCISES: CPT | Performed by: OCCUPATIONAL THERAPIST

## 2019-04-24 NOTE — FLOWSHEET NOTE
[facility], [employer]  [address] [place] [address]  Phone [phone]      Fax [phone]    Hand Therapy Daily Treatment Note  Date:  2019    Patient: Fredi Meyers   : 1975   MRN: [id number]  Referring Physician: Referring Practitioner: Pineda Hylton       Medical Diagnosis Information:  Diagnosis: L thumb UCL rupture  L16.969S    L thumb Stener lesion S53. 32XA    Treatment Diagnosis: L thumb/hand stiffness M25.642                                         Insurance information: OT Insurance Information: [facility]   16 visits  Date of Injury: 19  Date of Surgery: 3/19/19      Visit # Insurance Allowable   3 16   19     Date of Patient follow up with Physician:  1 month    G-Codes:  OT G-codes  Functional Assessment Tool Used: Quick DASH   Score: 19 34% ( [id number]%)    Progress Note: []  Yes  [x]  No  Next due by: Visit #10      Latex Allergy:  [x]NO      []YES    Preferred Language for Healthcare:   [x]English       []other:    Pain level:  2/10 Current:3/10 mainly with exer    SUBJECTIVE:  Pain with IP flex of thumb .      RESTRICTIONS/PRECAUTIONS:   19 MD appointment :     Continue with therapy for range of motion, initiate some light pinching and gripping starting in 2 weeks  He will continue to wear his brace when out and about for an additional 2 weeks for protection and then may wean from the brace thereafter  Only strengthening guided by therapy until follow-up in approximately one month    OBJECTIVE:     Date: 4/10/19 4/17/19      Objective Measures:  4 weeks      See eval        Th   MP  IP L  /35  /20 L  /40  /25    R  0/60  +10/85          Th tip tp DPFC 6 cm 5    R .5cm      Strength   deferred                    Modalities: 4/10/19   4/17/19 4/24/19  5 wks     HP 10'  paraffin with th flex stretch gripping , light pinching    Fluidotherapy  12'                              Therapeutic Exercise & Activities:        AROM gentle  Blocked IP and MP  Th flex   Th abd  Th opp. AAROM  Blocked IP  Th flex  10Xea    AROM  Th abd  Th opp AAROM  Blocked IP  Th flex  10Xea     gripping , light pinching    Manual Scar massage    Retrograde massage Scar massage    Added vibration Scar massage     vibration    IP jt mobs     Activities/function  Nut and bolt  10X    Th flex with lg and sm foam cubes placed on ulnar side of [facility]    Golf balls manipulattion within hand Th flex with sm foam cubes placed on ulnar side of [facility]      thumbciser  1 light band  15X3    Th abd with foam cube    Yellow putty   Gentle , th flex, th add, th flex                                                         Therapeutic Exercise and NMR:  [x] (52723) Provided verbal/tactile cueing for activities related to strengthening, flexibility, endurance, ROM  for improvements in scapular, scapulothoracic and UE control with self care, reaching, carrying, lifting, house/yardwork, driving/computer work. [x] (68350) Provided verbal/tactile cueing for activities related to improving balance, coordination, kinesthetic sense, posture, motor skill, proprioception  to assist with  scapular, scapulothoracic and UE control with self care, reaching, carrying, lifting, house/yardwork, driving/computer work.   [] Comments:    Therapeutic Activities:    [] (03933 or 56129) Provided verbal/tactile cueing for activities related to improving balance, coordination, kinesthetic sense, posture, motor skill, proprioception and motor activation to allow for proper function of scapular, scapulothoracic and UE control with self care, carrying, lifting, driving/computer work  [] Comments:    Home Exercise Program:    [x] (19511) Reviewed/Progressed HEP activities related to strengthening, flexibility, endurance, ROM of scapular, scapulothoracic and UE control with self care, reaching, carrying, lifting, house/yardwork, driving/computer work  [] (59223) Reviewed/Progressed HEP activities related to improving balance, coordination, kinesthetic sense, posture, motor skill, proprioception of scapular, scapulothoracic and UE control with self care, reaching, carrying, lifting, house/yardwork, driving/computer work    [x] Comments: handouts    Manual Treatments:  PROM / STM / Oscillations-Mobs:  G-I, II, III, IV (PA's, Inf., Post.)  [x] (86522) Provided manual therapy to mobilize soft tissue/joints of cervical/CT, scapular GHJ and UE for the purpose of modulating pain, promoting relaxation,  increasing ROM, reducing/eliminating soft tissue swelling/inflammation/restriction, improving soft tissue extensibility and allowing for proper ROM for normal function with self care, reaching, carrying, lifting, house/yardwork, driving/computer work  [] Comments:    ADL Training:  []  (54219) Provided self-care/home management training related to activities of daily living and compensatory training, and/or use of adaptive equipment   [] Comments:     Splinting:  [] Fabrication of:   [] (51089) Checkout for orthotic/prosthetic use, established patient   [] (04146) Orthotic management and training (fitting and assessment)  [x] Comments:added IP flex loop to splint. Pt. To use 3 x's per day for 10 minutes to assist with AROM. Orthotic management and training subsequent encounter    Charges:  Timed Code Treatment Minutes:  40   Total Treatment Minutes: 65   2:15 - 3:20  [] EVAL (LOW) 98666   [] OT Re-eval (87228)  [] EVAL (MOD) 26424   [] EVAL (HIGH) 78816       [x] Stephen (76721) x  2  25 [] VZNGU(42327)  [] NMR (01782) x      [] Estim (attended) (95059)   [x] Manual (17899) x  1  15  [] US (78019)  [] TA (08905) x      [] Paraffin (57692)  [] ADL  (57025) x     [] Splint/L code:    [] Estim (unattended) (07775)  [x] Other:splint modification  Subsequent encounter    Frequency/Duration:  1-2 days per week for 8 weeks         GOALS:  Short Term Goals: To be achieved in: 2 weeks  1. Independent in HEP and progression per patient tolerance, in order to prevent re-injury. 2. Patient will have a decrease in pain to facilitate improvement in movement, function, and ADLs as indicated by Functional Deficits.     Long Term Goals to be achieved in 8  weeks, including patient directed goals to address identified performance deficits:  1) Pt to be independent in graded HEP progression with a good level of effort and compliance. 2) Pt to report a score of 52 % or less on the Quick DASH disability questionnaire for increased performance with carrying, moving, and handling objects. 3) Pt will demonstrate increased ROM to L th tip to [facility] </= 2 cm for improved performance with fastening, opening containers,. 4) Pt will demonstrate increased strength to L  within 12# of R and 3 pt pinch within 5# of R for improved performance with opening containers, lifting, and performing job requirements. 5) Pt will have a decrease in pain to 2/1 with moderate resistive tasks to facilitate improvement in performance ADL and work tasks.         Progression Towards Functional goals:  [x] Patient is progressing as expected towards functional goals listed. [] Progression is slowed due to complexities listed. [] Progression has been slowed due to co-morbidities. [] Plan just implemented, too soon to assess goals progression  [] Other:     ASSESSMENT:  Pt felt passive flex of thumb IP joint was helpful. Added IP flex loop to th spica splint. Pt.  To use 2 x's per day for 10 minutes     Treatment/Activity Tolerance:  [] Patient tolerated treatment well [] Patient limited by fatique  [x] Patient limited by pain  [] Patient limited by other medical complications  [] Other:     Prognosis: [x] Good [] Fair  [] Poor    Patient Requires Follow-up: [x] Yes  [] No    PLAN: Progress per protocol and MD recomendations  [x] Continue per plan of care [] Alter current plan (see comments)  [] Plan of care initiated [] Hold

## 2019-05-02 ENCOUNTER — HOSPITAL ENCOUNTER (OUTPATIENT)
Dept: OCCUPATIONAL THERAPY | Age: 44
Setting detail: THERAPIES SERIES
Discharge: HOME | End: 2019-05-02
Payer: COMMERCIAL

## 2019-05-02 PROCEDURE — 97112 NEUROMUSCULAR REEDUCATION: CPT | Performed by: OCCUPATIONAL THERAPIST

## 2019-05-02 PROCEDURE — 97530 THERAPEUTIC ACTIVITIES: CPT | Performed by: OCCUPATIONAL THERAPIST

## 2019-05-02 PROCEDURE — 97140 MANUAL THERAPY 1/> REGIONS: CPT | Performed by: OCCUPATIONAL THERAPIST

## 2019-05-02 NOTE — FLOWSHEET NOTE
[facility], [address] [address]  Phone [phone]      Fax [phone]    Hand Therapy Daily Treatment Note  Date:  2019    Patient: Kinsey Wilson   : 1975   MRN: [id number]  Referring Physician: Referring Practitioner: Lio Greene       Medical Diagnosis Information:  Diagnosis: L thumb UCL rupture  O14.599T    L thumb Stener lesion S53. 32XA    Treatment Diagnosis: L thumb/hand stiffness M25.642                                         Insurance information: OT Insurance Information: [facility]   16 visits  Date of Injury: 19  Date of Surgery: 3/19/19      Visit # Insurance Allowable   4 16   19     Date of Patient follow up with Physician:  1 month    G-Codes:  OT G-codes  Functional Assessment Tool Used: Quick DASH   Score: 19 34% ( [id number]%)    Progress Note: []  Yes  [x]  No  Next due by: Visit #10      Latex Allergy:  [x]NO      []YES    Preferred Language for Healthcare:   [x]English       []other:    Pain level:  2/10 Current:3/10 mainly with exer    SUBJECTIVE:  Thumb is doing better. Moves better when it is warm. Still having some nerve issues. Temperature feels different on different parts of thumb.       RESTRICTIONS/PRECAUTIONS:   19 MD appointment :     Continue with therapy for range of motion, initiate some light pinching and gripping starting in 2 weeks  He will continue to wear his brace when out and about for an additional 2 weeks for protection and then may wean from the brace thereafter  Only strengthening guided by therapy until follow-up in approximately one month    OBJECTIVE:     Date: 4/10/19 4/17/19 5/2/19     Objective Measures:  4 weeks      See eval        Th   MP  IP L  /35  /20 L  /40  /25    R  0/60  +10/85       0/60  0/60     Th tip tp DPFC 6 cm 5    R .5cm      Strength   deferred    R 110#  L - 65#        Lateral Pinch  R - 20  L - 11 Provided verbal/tactile cueing for activities related to improving balance, coordination, kinesthetic sense, posture, motor skill, proprioception and motor activation to allow for proper function of scapular, scapulothoracic and UE control with self care, carrying, lifting, driving/computer work  [] Comments:    Home Exercise Program:    [x] (53914) Reviewed/Progressed HEP activities related to strengthening, flexibility, endurance, ROM of scapular, scapulothoracic and UE control with self care, reaching, carrying, lifting, house/yardwork, driving/computer work  [] (86904) Reviewed/Progressed HEP activities related to improving balance, coordination, kinesthetic sense, posture, motor skill, proprioception of scapular, scapulothoracic and UE control with self care, reaching, carrying, lifting, house/yardwork, driving/computer work    [x] Comments: handouts    Manual Treatments:  PROM / STM / Oscillations-Mobs:  G-I, II, III, IV (PA's, Inf., Post.)  [x] (22890) Provided manual therapy to mobilize soft tissue/joints of cervical/CT, scapular GHJ and UE for the purpose of modulating pain, promoting relaxation,  increasing ROM, reducing/eliminating soft tissue swelling/inflammation/restriction, improving soft tissue extensibility and allowing for proper ROM for normal function with self care, reaching, carrying, lifting, house/yardwork, driving/computer work  [] Comments:    ADL Training:  []  (43913) Provided self-care/home management training related to activities of daily living and compensatory training, and/or use of adaptive equipment   [] Comments:     Splinting:  [] Fabrication of:   [] (66134) Checkout for orthotic/prosthetic use, established patient   [] (83872) Orthotic management and training (fitting and assessment)  [] Comments:added IP flex loop to splint. Pt. To use 3 x's per day for 10 minutes to assist with AROM.   Orthotic management and training subsequent encounter    Charges:  Timed Code Treatment Minutes:  40   Total Treatment Minutes: 50     [] EVAL (LOW) 02809   [] OT Re-eval (77561)  [] EVAL (MOD) 22884   [] EVAL (HIGH) 32978       [x] Stephen (84678) x  1  315-325 [] BHBCI(72354)  [] NMR (14676) x      [] Estim (attended) (20097)   [x] Manual (08504) x  1  325-340 [] US (98372)  [x] TA (45410) x  1  340-355 [] Paraffin (47366)  [] ADL  (81213) x     [] Splint/L code:    [] Estim (unattended) (67062)  [x] Other:splint modification  Subsequent encounter    Frequency/Duration:  1-2 days per week for 8 weeks         GOALS:  Short Term Goals: To be achieved in: 2 weeks  1. Independent in HEP and progression per patient tolerance, in order to prevent re-injury. 2. Patient will have a decrease in pain to facilitate improvement in movement, function, and ADLs as indicated by Functional Deficits.     Long Term Goals to be achieved in 8  weeks, including patient directed goals to address identified performance deficits:  1) Pt to be independent in graded HEP progression with a good level of effort and compliance. 2) Pt to report a score of 52 % or less on the Quick DASH disability questionnaire for increased performance with carrying, moving, and handling objects. 3) Pt will demonstrate increased ROM to L th tip to [facility] </= 2 cm for improved performance with fastening, opening containers,. 4) Pt will demonstrate increased strength to L  within 12# of R and 3 pt pinch within 5# of R for improved performance with opening containers, lifting, and performing job requirements. 5) Pt will have a decrease in pain to 2/1 with moderate resistive tasks to facilitate improvement in performance ADL and work tasks.         Progression Towards Functional goals:  [x] Patient is progressing as expected towards functional goals listed. [] Progression is slowed due to complexities listed. [] Progression has been slowed due to co-morbidities.   [] Plan just implemented, too soon to assess goals progression  [] Other: ASSESSMENT:  Good gains in ROM to left thumb. Tolerated resistive activities well.    Treatment/Activity Tolerance:  [x] Patient tolerated treatment well [] Patient limited by fatique  [] Patient limited by pain  [] Patient limited by other medical complications  [] Other:     Prognosis: [x] Good [] Fair  [] Poor    Patient Requires Follow-up: [x] Yes  [] No    PLAN: Progress per protocol and MD recomendations  [x] Continue per plan of care [] Alter current plan (see comments)  [] Plan of care initiated [] Hold pending MD visit [] Discharge    Electronically signed by: Kain Wheeler, [address] [place] [id number]

## 2019-05-08 ENCOUNTER — HOSPITAL ENCOUNTER (OUTPATIENT)
Dept: OCCUPATIONAL THERAPY | Age: 44
Setting detail: THERAPIES SERIES
Discharge: HOME | End: 2019-05-08
Payer: COMMERCIAL

## 2019-05-08 PROCEDURE — 97018 PARAFFIN BATH THERAPY: CPT | Performed by: OCCUPATIONAL THERAPIST

## 2019-05-08 PROCEDURE — 97110 THERAPEUTIC EXERCISES: CPT | Performed by: OCCUPATIONAL THERAPIST

## 2019-05-08 NOTE — FLOWSHEET NOTE
[facility], [address] [address]  Phone [phone]      Fax [phone]    Hand Therapy Daily Treatment Note  Date:  2019    Patient: Dominik Pat   : 1975   MRN: [id number]  Referring Physician: Referring Practitioner: Yane Mitchell       Medical Diagnosis Information:  Diagnosis: L thumb UCL rupture  G61.110I    L thumb Stener lesion S53. 32XA    Treatment Diagnosis: L thumb/hand stiffness M25.642                                         Insurance information: OT Insurance Information: [id number] [facility]   16 visits  Date of Injury: 19  Date of Surgery: 3/19/19      Visit # Insurance Allowable   4 16   19     Date of Patient follow up with Physician:  5/15/19    G-Codes:  OT G-codes  Functional Assessment Tool Used: Quick DASH   Score: 19 9% ( [id number]%)    Progress Note: []  Yes  [x]  No  Next due by: Visit #10      Latex Allergy:  [x]NO      []YES    Preferred Language for Healthcare:   [x]English       []other:    Pain level:  2/10 Current:3/10 mainly with exer    SUBJECTIVE:  Able to make fist without pain    19 Thumb is doing better. Moves better when it is warm. Still having some nerve issues. Temperature feels different on different parts of thumb.       RESTRICTIONS/PRECAUTIONS:   19 MD appointment :     Continue with therapy for range of motion, initiate some light pinching and gripping starting in 2 weeks  He will continue to wear his brace when out and about for an additional 2 weeks for protection and then may wean from the brace thereafter  Only strengthening guided by therapy until follow-up in approximately one month    OBJECTIVE:     Date: 4/10/19 4/17/19 5/2/19     Objective Measures:  4 weeks      See eval        Th   MP  IP L  /35  /20 L  /40  /25    R  0/60  +10/85       0/60  0/60     Th tip tp DPFC 6 cm 5    R .5cm      Strength   deferred    R 110#  L - 65# Lateral Pinch  R - 20  L - 11        Three Point PInch  R - 20  L - 11           Modalities: 4/10/19   4/17/19 4/24/19  5 wks 5/2/19 5/8/19   HP 10'  paraffin with th flex stretch Flexion tape to thumb with hot pack paraffinwith th flex stretch   Fluidotherapy  12'                              Therapeutic Exercise & Activities:        AROM gentle  Blocked IP and MP  Th flex   Th abd  Th opp. AAROM  Blocked IP  Th flex  10Xea    AROM  Th abd  Th opp AAROM  Blocked IP  Th flex  10Xea     PROM to left thumb. Isolated IP, MCP and composite flexion left thumb. Manual Scar massage    Retrograde massage Scar massage    Added vibration Scar massage     vibration    IP jt mobs Scar and joint maobilization    Activities/function  Nut and bolt  10X    Th flex with lg and sm foam cubes placed on ulnar side of [facility]    Golf balls manipulattion within hand Th flex with sm foam cubes placed on ulnar side of [facility]      thumbciser  1 light band  15X3    Th abd with foam cube    Yellow putty   Gentle , th flex, th add, th flex Squeezing and manipulating green sponge. Pressing thumb into green sponge. Three point pinch lengthwise into green sponge. Issued for home.     Translating various size objects in hand from distal to proximal and ulnar to radial emphasizing flexing IP of thumb across palm Th flex to  foam cubes on ulnar side of hand    Red putty  , roll, pinch, th flex, th add    Spheres  5'    Thumbciser  Med band  20X2    velcrochechers   remove with 3 pt pinch replace with blue clip using lat pinch  16x4    Red web  Th flex                                                                  Therapeutic Exercise and NMR:  [x] (11561) Provided verbal/tactile cueing for activities related to strengthening, flexibility, endurance, ROM  for improvements in scapular, scapulothoracic and UE control with self care, reaching, carrying, lifting, house/yardwork, driving/computer work.    [] (04250) Provided verbal/tactile cueing for activities related to improving balance, coordination, kinesthetic sense, posture, motor skill, proprioception  to assist with  scapular, scapulothoracic and UE control with self care, reaching, carrying, lifting, house/yardwork, driving/computer work.   [] Comments:    Therapeutic Activities:    [x] (59841 or 29008) Provided verbal/tactile cueing for activities related to improving balance, coordination, kinesthetic sense, posture, motor skill, proprioception and motor activation to allow for proper function of scapular, scapulothoracic and UE control with self care, carrying, lifting, driving/computer work  [] Comments:    Home Exercise Program:    [x] (21244) Reviewed/Progressed HEP activities related to strengthening, flexibility, endurance, ROM of scapular, scapulothoracic and UE control with self care, reaching, carrying, lifting, house/yardwork, driving/computer work  [] (42936) Reviewed/Progressed HEP activities related to improving balance, coordination, kinesthetic sense, posture, motor skill, proprioception of scapular, scapulothoracic and UE control with self care, reaching, carrying, lifting, house/yardwork, driving/computer work    [x] Comments: handouts    Manual Treatments:  PROM / STM / Oscillations-Mobs:  G-I, II, III, IV (PA's, Inf., Post.)  [x] (89537) Provided manual therapy to mobilize soft tissue/joints of cervical/CT, scapular GHJ and UE for the purpose of modulating pain, promoting relaxation,  increasing ROM, reducing/eliminating soft tissue swelling/inflammation/restriction, improving soft tissue extensibility and allowing for proper ROM for normal function with self care, reaching, carrying, lifting, house/yardwork, driving/computer work  [] Comments:    ADL Training:  []  (39745) Provided self-care/home management training related to activities of daily living and compensatory training, and/or use of adaptive equipment   [] Comments:     Splinting:  [] Fabrication of:   [] (25291) Checkout for orthotic/prosthetic use, established patient   [] (83021) Orthotic management and training (fitting and assessment)  [] Comments:added IP flex loop to splint. Pt. To use 3 x's per day for 10 minutes to assist with AROM. Orthotic management and training subsequent encounter    Charges:  Timed Code Treatment Minutes:  45   Total Treatment Minutes: 55     1:40- 2:35      [] EVAL (LOW) 02552   [] OT Re-eval (20401)  [] EVAL (MOD) 56047   [] EVAL (HIGH) 72746       [x] Stephen (03669) x  3 45'  [] XOKOP(69867)  [] NMR (96765) x      [] Estim (attended) (72717)   [] Manual (54100) x      [] EZ (92805)  [] TA (67737) x      [x] Paraffin (80869) 10 '  [] ADL  ([phone]) x     [] Splint/L code:    [] Estim (unattended) (74892)  [] Other:splint modification  Subsequent encounter    Frequency/Duration:  1-2 days per week for 8 weeks         GOALS:  Short Term Goals: To be achieved in: 2 weeks  1. Independent in HEP and progression per patient tolerance, in order to prevent re-injury. 2. Patient will have a decrease in pain to facilitate improvement in movement, function, and ADLs as indicated by Functional Deficits.     Long Term Goals to be achieved in 8  weeks, including patient directed goals to address identified performance deficits:  1) Pt to be independent in graded HEP progression with a good level of effort and compliance. 2) Pt to report a score of 52 % or less on the Quick DASH disability questionnaire for increased performance with carrying, moving, and handling objects. 3) Pt will demonstrate increased ROM to L th tip to [facility] </= 2 cm for improved performance with fastening, opening containers,. 4) Pt will demonstrate increased strength to L  within 12# of R and 3 pt pinch within 5# of R for improved performance with opening containers, lifting, and performing job requirements.   5) Pt will have a decrease in pain to 2/1 with moderate resistive tasks to facilitate improvement in performance ADL and work tasks.         Progression Towards Functional goals:  [x] Patient is progressing as expected towards functional goals listed. [] Progression is slowed due to complexities listed. [] Progression has been slowed due to co-morbidities. [] Plan just implemented, too soon to assess goals progression  [] Other:     ASSESSMENT: Cont to tolerate gradual strengthening well. Treatment/Activity Tolerance:  [x] Patient tolerated treatment well [] Patient limited by fatique  [] Patient limited by pain  [] Patient limited by other medical complications  [] Other:     Prognosis: [x] Good [] Fair  [] Poor    Patient Requires Follow-up: [x] Yes  [] No    PLAN: Progress per protocol and MD recomendations  [x] Continue per plan of care [] Alter current plan (see comments)  [] Plan of care initiated [] Hold pending MD visit [] Discharge    Electronically signed by:  [address]ner, [address]

## 2019-05-15 ENCOUNTER — HOSPITAL ENCOUNTER (OUTPATIENT)
Dept: OCCUPATIONAL THERAPY | Age: 44
Setting detail: THERAPIES SERIES
Discharge: HOME | End: 2019-05-15
Payer: COMMERCIAL

## 2019-05-15 ENCOUNTER — OFFICE VISIT (OUTPATIENT)
Dept: ORTHOPEDIC SURGERY | Age: 44
End: 2019-05-15

## 2019-05-15 DIAGNOSIS — S63.642A RUPTURE OF ULNAR COLLATERAL LIGAMENT OF LEFT THUMB, INITIAL ENCOUNTER: Primary | ICD-10-CM

## 2019-05-15 DIAGNOSIS — S69.92XA INJURY OF LEFT THUMB, INITIAL ENCOUNTER: ICD-10-CM

## 2019-05-15 PROCEDURE — 97110 THERAPEUTIC EXERCISES: CPT | Performed by: OCCUPATIONAL THERAPIST

## 2019-05-15 PROCEDURE — 99024 POSTOP FOLLOW-UP VISIT: CPT | Performed by: ORTHOPAEDIC SURGERY

## 2019-05-15 NOTE — FLOWSHEET NOTE
house/yardwork, driving/computer work. [x] (01341) Provided verbal/tactile cueing for activities related to improving balance, coordination, kinesthetic sense, posture, motor skill, proprioception  to assist with  scapular, scapulothoracic and UE control with self care, reaching, carrying, lifting, house/yardwork, driving/computer work.   [] Comments:    Therapeutic Activities:    [x] (61219 or 66164) Provided verbal/tactile cueing for activities related to improving balance, coordination, kinesthetic sense, posture, motor skill, proprioception and motor activation to allow for proper function of scapular, scapulothoracic and UE control with self care, carrying, lifting, driving/computer work  [] Comments:    Home Exercise Program:    [x] (79507) Reviewed/Progressed HEP activities related to strengthening, flexibility, endurance, ROM of scapular, scapulothoracic and UE control with self care, reaching, carrying, lifting, house/yardwork, driving/computer work  [] (24104) Reviewed/Progressed HEP activities related to improving balance, coordination, kinesthetic sense, posture, motor skill, proprioception of scapular, scapulothoracic and UE control with self care, reaching, carrying, lifting, house/yardwork, driving/computer work    [x] Comments: handouts    Manual Treatments:  PROM / STM / Oscillations-Mobs:  G-I, II, III, IV (PA's, Inf., Post.)  [] (61556) Provided manual therapy to mobilize soft tissue/joints of cervical/CT, scapular GHJ and UE for the purpose of modulating pain, promoting relaxation,  increasing ROM, reducing/eliminating soft tissue swelling/inflammation/restriction, improving soft tissue extensibility and allowing for proper ROM for normal function with self care, reaching, carrying, lifting, house/yardwork, driving/computer work  [] Comments:    ADL Training:  []  (30304) Provided self-care/home management training related to activities of daily living and compensatory training, and/or use of with moderate resistive tasks to facilitate improvement in performance ADL and work tasks.         Progression Towards Functional goals:  [x] Patient is progressing as expected towards functional goals listed. [] Progression is slowed due to complexities listed. [] Progression has been slowed due to co-morbidities. [] Plan just implemented, too soon to assess goals progression  [] Other:     ASSESSMENT: Good progress with strength     Treatment/Activity Tolerance:  [x] Patient tolerated treatment well [] Patient limited by fatique  [] Patient limited by pain  [] Patient limited by other medical complications  [] Other:     Prognosis: [x] Good [] Fair  [] Poor    Patient Requires Follow-up: [x] Yes  [] No    PLAN: Progress per protocol and MD recomendations  [x] Continue per plan of care [] Alter current plan (see comments)  [] Plan of care initiated [] Hold pending MD visit [] Discharge    Electronically signed by:  [id number] , [place], [address]

## 2019-05-28 ENCOUNTER — HOSPITAL ENCOUNTER (OUTPATIENT)
Dept: OCCUPATIONAL THERAPY | Age: 44
Setting detail: THERAPIES SERIES
Discharge: HOME | End: 2019-05-28
Payer: COMMERCIAL

## 2019-05-28 PROCEDURE — 97110 THERAPEUTIC EXERCISES: CPT | Performed by: OCCUPATIONAL THERAPIST

## 2019-05-28 NOTE — FLOWSHEET NOTE
[facility], [address] [address]  Phone [phone]      Fax [phone]    Hand Therapy Daily Treatment Note  Date:  2019    Patient: Deonte Yarbrough   : 1975   MRN: [id number]  Referring Physician: Referring Practitioner: Martin Lozano       Medical Diagnosis Information:  Diagnosis: L thumb UCL rupture  J22.327F    L thumb Stener lesion S53. 32XA    Treatment Diagnosis: L thumb/hand stiffness M25.642                                         Insurance information: OT Insurance Information: [address] [facility]   16 visits  Date of Injury: 19  Date of Surgery: 3/19/19      Visit # Insurance Allowable   5 16   19     Date of Patient follow up with Physician:  5/15/19    G-Codes:  OT G-codes  Functional Assessment Tool Used: Quick DASH   Score: 19 0% ( initial 52%)    Progress Note: []  Yes  [x]  No  Next due by: Visit #10      Latex Allergy:  [x]NO      []YES    Preferred Language for Healthcare:   [x]English       []other:    Pain level:  2/10 Current:1-210 mainly with exer    SUBJECTIVE:  Feels thumb has loosened up. No functional concerns.       RESTRICTIONS/PRECAUTIONS:   19 MD appointment :     Continue with therapy for range of motion, initiate some light pinching and gripping starting in 2 weeks  He will continue to wear his brace when out and about for an additional 2 weeks for protection and then may wean from the brace thereafter  Only strengthening guided by therapy until follow-up in approximately one month    OBJECTIVE:     Date: 4/10/19 4/17/19 5/2/19 5/15/19  8wks 19  10 wks   Objective Measures:  4 weeks      See eval        Th   MP  IP L  /35  /20 L  /40  /25    R  0/60  +10/85       0/60  0/60 L  0/60  0/75 L  0/60  0/75   Th tip tp DPFC 6 cm 5    R .5cm   0   Strength   deferred    R 110#  L - 65#     L 85# 105  L 100          Lateral Pinch  R - 20  L - 11     L 13# Lat  R 21  L 12      Three Point PInch  R - 20  L - 11     L15# 3 Pt.  R 19#  L 16#           Modalities: 4/10/19   5/2/19 5/8/19 5/15/19 5/28/19   HP 10' Flexion tape to thumb with hot pack paraffin with th flex stretch     Fluidotherapy                                Therapeutic Exercise & Activities:        AROM gentle  Blocked IP and MP  Th flex   Th abd  Th opp. PROM to left thumb. Isolated IP, MCP and composite flexion left thumb. Manual Scar massage    Retrograde massage Scar and joint mobilization      Activities/function  Squeezing and manipulating green sponge. Pressing thumb into green sponge. Three point pinch lengthwise into green sponge. Issued for home. Translating various size objects in hand from distal to proximal and ulnar to radial emphasizing flexing IP of thumb across palm Th flex to  foam cubes on ulnar side of hand    Red putty  , roll, pinch, th flex, th add    Spheres  5'    Thumbciser  Med band  20X2    velcrocheckers   remove with 3 pt pinch replace with blue clip using lat pinch  16x4    Red web  Th flex Spheres  5'    velcrocheckers  Black clip    5 lb. Red putty split    Red putty  Th add/abd  Th flex  Pinch    Red web  Th flex  Twist    Gr flexbar  Twist  RD/UD  20xea    rubberband exer  Th ext/flex  Abd/add  15x ea Gr flexbar  Twist  RD/UD  20xea                                                                  Therapeutic Exercise and NMR:  [x] (39112) Provided verbal/tactile cueing for activities related to strengthening, flexibility, endurance, ROM  for improvements in scapular, scapulothoracic and UE control with self care, reaching, carrying, lifting, house/yardwork, driving/computer work.     [x] (67160) Provided verbal/tactile cueing for activities related to improving balance, coordination, kinesthetic sense, posture, motor skill, proprioception  to assist with  scapular, scapulothoracic and UE control with self care, reaching, carrying, lifting, house/yardwork, driving/computer work. [] Comments:    Therapeutic Activities:    [x] (55932 or 60373) Provided verbal/tactile cueing for activities related to improving balance, coordination, kinesthetic sense, posture, motor skill, proprioception and motor activation to allow for proper function of scapular, scapulothoracic and UE control with self care, carrying, lifting, driving/computer work  [] Comments:    Home Exercise Program:    [x] (74379) Reviewed/Progressed HEP activities related to strengthening, flexibility, endurance, ROM of scapular, scapulothoracic and UE control with self care, reaching, carrying, lifting, house/yardwork, driving/computer work  [] (55249) Reviewed/Progressed HEP activities related to improving balance, coordination, kinesthetic sense, posture, motor skill, proprioception of scapular, scapulothoracic and UE control with self care, reaching, carrying, lifting, house/yardwork, driving/computer work    [x] Comments: handouts    Manual Treatments:  PROM / STM / Oscillations-Mobs:  G-I, II, III, IV (PA's, Inf., Post.)  [] (02922) Provided manual therapy to mobilize soft tissue/joints of cervical/CT, scapular GHJ and UE for the purpose of modulating pain, promoting relaxation,  increasing ROM, reducing/eliminating soft tissue swelling/inflammation/restriction, improving soft tissue extensibility and allowing for proper ROM for normal function with self care, reaching, carrying, lifting, house/yardwork, driving/computer work  [] Comments:    ADL Training:  []  (04736) Provided self-care/home management training related to activities of daily living and compensatory training, and/or use of adaptive equipment   [] Comments:     Splinting:  [] Fabrication of:   [] (97346) Checkout for orthotic/prosthetic use, established patient   [] (44784) Orthotic management and training (fitting and assessment)  [] Comments:added IP flex loop to splint. Pt.  To use 3 x's per day for 10 minutes to assist with AROM. Orthotic management and training subsequent encounter    Charges:  Timed Code Treatment Minutes:  60   Total Treatment Minutes: 65   2:17-  3:17      [] EVAL (LOW) 60319   [] OT Re-eval (24917)  [] EVAL (MOD) 87783   [] EVAL (HIGH) 18810       [x] Stephen (98540) x  4 60'  [] RPLIN(66482)  [] NMR (41015) x      [] Estim (attended) (31262)   [] Manual (27664) x      [] US (36952)  [] TA (34333) x      [] Paraffin (21143)   [] ADL  (02021) x     [] Splint/L code:    [] Estim (unattended) 33 [phone] 31)  [] Other:splint modification  Subsequent encounter    Frequency/Duration:  1-2 days per week for 8 weeks         GOALS:  Short Term Goals: To be achieved in: 2 weeks  1. Independent in HEP and progression per patient tolerance, in order to prevent re-injury. 2. Patient will have a decrease in pain to facilitate improvement in movement, function, and ADLs as indicated by Functional Deficits.     Long Term Goals to be achieved in 8  weeks, including patient directed goals to address identified performance deficits:  1) Pt to be independent in graded HEP progression with a good level of effort and compliance. MET  2) Pt to report a score of 52 % or less on the Quick DASH disability questionnaire for increased performance with carrying, moving, and handling objects. MET  3) Pt will demonstrate increased ROM to L th tip to [facility] </= 2 cm for improved performance with fastening, opening containers,. MET  4) Pt will demonstrate increased strength to L  within 12# of R and 3 pt pinch within 5# of R for improved performance with opening containers, lifting, and performing job requirements. MET  5) Pt will have a decrease in pain to 2/10 with moderate resistive tasks to facilitate improvement in performance ADL and work tasks. MET         Progression Towards Functional goals:  [x] Patient is progressing as expected towards functional goals listed. [] Progression is slowed due to complexities listed.   [] Progression has been slowed due to co-morbidities. [] Plan just implemented, too soon to assess goals progression  [x] Other: All goals met    ASSESSMENT: All goals met. Pt. To return to full duty work. Treatment/Activity Tolerance:  [x] Patient tolerated treatment well [] Patient limited by fatigue  [] Patient limited by pain  [] Patient limited by other medical complications  [] Other:     Prognosis: [x] Good [] Fair  [] Poor    Patient Requires Follow-up: [] Yes  [x] No    PLAN:   [] Continue per plan of care [] Alter current plan (see comments)  [] Plan of care initiated [] Hold pending MD visit [x] Discharge    Electronically signed by:  [id number] , [place], [address]

## 2019-05-30 NOTE — DISCHARGE SUMMARY
[employer] [employer]        Occupational Therapy Discharge  Date: 2019        Patient Name:  Otis Gage    :  1975  MRN: [id number]  Referring Physician: Kolton Vinson  Diagnosis:     Diagnosis: L thumb UCL rupture  Z09.163A    L thumb Stener lesion S53. 32XA                   Treatment Diagnosis: L thumb/hand stiffness M25.642                                                                ICD Code:    Practice Pattern: OT  Surgery/Conservative:   Surgery  Cobormidities :1  Total number of visits: 5   Reporting Period:   Beginning Date: 4/10/19 End Date: 19     OBJECTIVE  Test used Initial score Discharge Score   Pain Summary Numeric Scale 2/10 1-2/10   Functional questionnaire Quick DASH 52% 0%   ROM Th   MP  IP L  /35  /20 L  0/60  0/75    Th tip tp DPFC 6 cm 0 cm         Strength  65# 100#    Lateral Pinch 11# 13#    Three Point  11# 16#      Treatment to date:  [x] Therapeutic Exercise    Modalities:  [x] Therapeutic Activity             []Ultrasound             []Electrical Stimulation  [] ADL Training     [x] Paraffin  []Iontophoresis  [] Neuromuscular Re-education [x] Cold/hotpack         [x] Instruction in HEP      [x] Manual Therapy     [x] Other: fluidotherapy                       ? [] Splinting    Assessment:  [x] All Goals were achieved. [] The following goals were achieved (#'s):  [] The following goals were not achieved for the following reasons:                  Plan:  Reason for Discharge:   [x] All goals achieved    [] Patient having surgery    [] Physician discontinued therapy    [] Insurance/Financial Limitations   [] Patient did not return for follow up visits   [] Home program/1 visit only   [] No subjective or objective improvement   [] Plateaued   [] Patient was unable to adhere to the plan of care established at evaluation. [] Referred back to physician for re-evaluation and did not return.       [] Other:?       Electronically signed by:   Eve Neff  OTR/L [address] [facility]  [place] [id number]

## 2019-06-12 ENCOUNTER — OFFICE VISIT (OUTPATIENT)
Dept: ORTHOPEDIC SURGERY | Age: 44
End: 2019-06-12

## 2019-06-12 DIAGNOSIS — S53.32XA STENER LESION OF LEFT THUMB, INITIAL ENCOUNTER: ICD-10-CM

## 2019-06-12 DIAGNOSIS — S69.92XA INJURY OF LEFT THUMB, INITIAL ENCOUNTER: ICD-10-CM

## 2019-06-12 DIAGNOSIS — S63.642A RUPTURE OF ULNAR COLLATERAL LIGAMENT OF LEFT THUMB, INITIAL ENCOUNTER: Primary | ICD-10-CM

## 2019-06-12 PROCEDURE — 99024 POSTOP FOLLOW-UP VISIT: CPT | Performed by: ORTHOPAEDIC SURGERY

## 2019-06-12 NOTE — PROGRESS NOTES
Chief Complaint:  Hand Pain (L HAND )      History of Present of Illness:  Mr. Slick Whiting is a 55-year-old male presenting his repeat scheduled postoperative visit after left thumb surgery  Procedure: Repair of left thumb MP joint ulnar collateral ligament  Date of procedure: 3/19/2019  He is now nearly 3 months status post surgery  He is doing well with no interval complaints since his last visit. He has been progressing his range of motion and activity as tolerated  No additional complaints or changes reported today      Examination:  General: Pleasant, well-appearing, no acute distress  Left upper extremity:  Well-healed incision with nontender scar soft and no evidence of hypertrophy     Normal thumb tenodesis and cascade with active thumb EPL and FPL with nearly symmetrical range of motion in all planes of thumb motion at MP and IP joints    Stable thumb to ulnar collateral limit stress at full extension and 30 degrees of flexion with symmetrical laxity compared with contralateral thumb  No increased pain with stress    Sensation grossly intact throughout the radial and ulnar aspect of thumb   Capillary refill brisk in thumb tip        Radiology:     X-rays obtained and reviewed in office:  No new images obtained today        No orders of the defined types were placed in this encounter. Impression:  Encounter Diagnoses   Name Primary?  Rupture of ulnar collateral ligament of left thumb, initial encounter Yes    Injury of left thumb, initial encounter     Stener lesion of left thumb, initial encounter          Treatment Plan:  Patient appears to be doing very well today  He has demonstrated excellent recovery with regards to range of motion and has been progressing his strength and activity. He may continue with activity as tolerated. We will plan to follow him in a longer term. For his work-related injury and I will see him back in 3 months for repeat evaluation and likely imaging of his left thumb.

## 2019-07-05 ENCOUNTER — TELEPHONE (OUTPATIENT)
Dept: ORTHOPEDIC SURGERY | Age: 44
End: 2019-07-05

## 2019-09-11 ENCOUNTER — OFFICE VISIT (OUTPATIENT)
Dept: ORTHOPEDIC SURGERY | Age: 44
End: 2019-09-11
Payer: COMMERCIAL

## 2019-09-11 DIAGNOSIS — S53.32XA STENER LESION OF LEFT THUMB, INITIAL ENCOUNTER: ICD-10-CM

## 2019-09-11 DIAGNOSIS — S63.642A RUPTURE OF ULNAR COLLATERAL LIGAMENT OF LEFT THUMB, INITIAL ENCOUNTER: Primary | ICD-10-CM

## 2019-09-11 PROCEDURE — 99212 OFFICE O/P EST SF 10 MIN: CPT | Performed by: ORTHOPAEDIC SURGERY

## 2020-06-08 ENCOUNTER — OFFICE VISIT (OUTPATIENT)
Dept: ORTHOPEDIC SURGERY | Age: 45
End: 2020-06-08
Payer: COMMERCIAL

## 2020-06-08 VITALS — RESPIRATION RATE: 17 BRPM | WEIGHT: 238 LBS | HEIGHT: 72 IN | BODY MASS INDEX: 32.23 KG/M2

## 2020-06-08 PROCEDURE — 99214 OFFICE O/P EST MOD 30 MIN: CPT | Performed by: ORTHOPAEDIC SURGERY

## 2020-06-11 ENCOUNTER — HOSPITAL ENCOUNTER (OUTPATIENT)
Dept: PHYSICAL THERAPY | Age: 45
Setting detail: THERAPIES SERIES
Discharge: HOME | End: 2020-06-11
Payer: COMMERCIAL

## 2020-06-11 PROCEDURE — 97161 PT EVAL LOW COMPLEX 20 MIN: CPT

## 2020-06-11 PROCEDURE — 97140 MANUAL THERAPY 1/> REGIONS: CPT

## 2020-06-11 PROCEDURE — 97110 THERAPEUTIC EXERCISES: CPT

## 2020-06-11 NOTE — FLOWSHEET NOTE
strengthening, flexibility, endurance, ROM  for improvements in scapular, scapulothoracic and UE control with self care, reaching, carrying, lifting, house/yardwork, driving/computer work.    [] (03374) Provided verbal/tactile cueing for activities related to improving balance, coordination, kinesthetic sense, posture, motor skill, proprioception  to assist with  scapular, scapulothoracic and UE control with self care, reaching, carrying, lifting, house/yardwork, driving/computer work. Therapeutic Activities:    [] (84240 or 47781) Provided verbal/tactile cueing for activities related to improving balance, coordination, kinesthetic sense, posture, motor skill, proprioception and motor activation to allow for proper function of scapular, scapulothoracic and UE control with self care, carrying, lifting, driving/computer work.      Home Exercise Program:    [x] (55354) Reviewed/Progressed HEP activities related to strengthening, flexibility, endurance, ROM of scapular, scapulothoracic and UE control with self care, reaching, carrying, lifting, house/yardwork, driving/computer work  [] (39744) Reviewed/Progressed HEP activities related to improving balance, coordination, kinesthetic sense, posture, motor skill, proprioception of scapular, scapulothoracic and UE control with self care, reaching, carrying, lifting, house/yardwork, driving/computer work      Manual Treatments:  PROM / STM / Oscillations-Mobs:  G-I, II, III, IV (PA's, Inf., Post.)  [x] (31850) Provided manual therapy to mobilize soft tissue/joints of cervical/CT, scapular GHJ and UE for the purpose of modulating pain, promoting relaxation,  increasing ROM, reducing/eliminating soft tissue swelling/inflammation/restriction, improving soft tissue extensibility and allowing for proper ROM for normal function with self care, reaching, carrying, lifting, house/yardwork, driving/computer work    Modalities: Declined ice     Charges:  Timed Code Treatment most recent update on progress.
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
## 2020-06-19 ENCOUNTER — HOSPITAL ENCOUNTER (OUTPATIENT)
Dept: PHYSICAL THERAPY | Age: 45
Setting detail: THERAPIES SERIES
Discharge: HOME | End: 2020-06-19
Payer: COMMERCIAL

## 2020-06-19 PROCEDURE — 97140 MANUAL THERAPY 1/> REGIONS: CPT

## 2020-06-19 PROCEDURE — 97110 THERAPEUTIC EXERCISES: CPT

## 2020-06-19 NOTE — FLOWSHEET NOTE
[facility]    Physical Therapy Treatment Note/ Progress Report:     Date:  2020    Patient Name:  Saurabh Starks  \"Akhil\"  :  1975  MRN: [id number]  Medical/Treatment Diagnosis Information:  · Diagnosis: Strain of left rotator cuff capsule (S46.012A)  · Treatment Diagnosis: Left shoulder pain   Insurance/Certification information:  PT Insurance Information: Workers Compensation   Physician Information:  Referring Practitioner: Dr. Adri Urbano of care signed (Y/N):     Date of Patient follow up with Physician: 20     Progress Report: []  Yes  [x]  No     Functional Scale: QuickDASH = 23% disability  Date: 20    Date Range for reporting period:  Beginnin20  Ending:      Progress report due (10 Rx/or 30 days whichever is less): 83     Recertification due (POC duration/ or 90 days whichever is less): 20     Visit # Insurance Allowable Auth Needed   2 12 visits approved 6/10/20 - 20 [x]Yes    []No     Pain level:  3/10 at worst     SUBJECTIVE:  Pt reports that overall, shoulder is feeling better. Pt states that he has less pain when trying to reach overhead, but still has some discomfort when reaching out to the side and behind his back.      OBJECTIVE: See eval   Observation:    Test measurements:      RESTRICTIONS/PRECAUTIONS: L shoulder RTC strain; hx of L thumb sx 2018    Exercises/Interventions:   Therapeutic Ex 22' Wt / Resistance Sets/sec Reps Notes          Supine cane flexion AAROM  10\" 10    Shoulder IR/ER isometrics  10\" 10    No monies red 2 10    S/L ER 0# 2 10    TB rows/ext green 2 10    TB IR/ER red 2 10    Strap IR stretch  10\" 10                                                                                 Manual Intervention 18'       Shld /GH Mobs 10'   Inferior & posterior mobs gr III   Post Cap mobs       Thoracic/Rib manipulation       CT MT/Mobs       PROM MT 8' not progressing as expected and requires additional follow up with physician  [] Other    Prognosis for POC: [x] Good [] Fair  [] Poor    Patient requires continued skilled intervention: [x] Yes  [] No      PLAN: Progress shoulder mobility and RTC/periscapular strength as tolerated by pt. [x] Continue per plan of care [] Alter current plan (see comments)  [] Plan of care initiated [] Hold pending MD visit [] Discharge    Electronically signed by: Jeanette Dan PT     Note: If patient does not return for scheduled/recommended follow up visits, this note will serve as a discharge from care along with the most recent update on progress.

## 2020-06-23 ENCOUNTER — HOSPITAL ENCOUNTER (OUTPATIENT)
Dept: PHYSICAL THERAPY | Age: 45
Setting detail: THERAPIES SERIES
Discharge: HOME | End: 2020-06-23
Payer: COMMERCIAL

## 2020-06-23 PROCEDURE — 97112 NEUROMUSCULAR REEDUCATION: CPT

## 2020-06-23 PROCEDURE — 97140 MANUAL THERAPY 1/> REGIONS: CPT

## 2020-06-23 PROCEDURE — 97110 THERAPEUTIC EXERCISES: CPT

## 2020-06-23 NOTE — FLOWSHEET NOTE
[facility]    Physical Therapy Treatment Note/ Progress Report:     Date:  2020    Patient Name:  Saundra Lynn  \"Akhil\"  :  1975  MRN: [id number]  Medical/Treatment Diagnosis Information:  · Diagnosis: Strain of left rotator cuff capsule (S46.012A)  · Treatment Diagnosis: Left shoulder pain   Insurance/Certification information:  PT Insurance Information: Workers Compensation   Physician Information:  Referring Practitioner: Dr. Lyly Parnell of care signed (Y/N):     Date of Patient follow up with Physician: 20     Progress Report: []  Yes  [x]  No     Functional Scale: QuickDASH = 23% disability  Date: 20    Date Range for reporting period:  Beginnin20  Ending:      Progress report due (10 Rx/or 30 days whichever is less): 8/15/68     Recertification due (POC duration/ or 90 days whichever is less): 20     Visit # Insurance Allowable Auth Needed   3 12 visits approved 6/10/20 - 20 [x]Yes    []No     Pain level:  3/10 at worst     SUBJECTIVE:  Pt states that reaching behind his back is improving and he has less pain with this, but he still has pain with trying to reach for his seatbelt with L arm. Pt states that he slept in an awkward position last night with arm reaching across his body and reports having some increased stiffness in shoulder this morning.        OBJECTIVE: See eval   Observation:    Test measurements:      RESTRICTIONS/PRECAUTIONS: L shoulder RTC strain; hx of L thumb sx 2018    Exercises/Interventions:   Therapeutic Ex 30' Wt / Resistance Sets/sec Reps Notes   Pulleys  4'  Flex/abd   Supine cane ER   5\" 15    Shoulder ER stretch at corner/doorway  10\" 10    Wall slides  5\" 15       S/L ER 1# 2 10    TB rows/ext green 2 10    TB IR/ER green 2 10    Strap IR stretch  10\" 10    Prone rows/ext  2 10                                                                   Manual

## 2020-06-25 ENCOUNTER — HOSPITAL ENCOUNTER (OUTPATIENT)
Dept: PHYSICAL THERAPY | Age: 45
Setting detail: THERAPIES SERIES
Discharge: HOME | End: 2020-06-25
Payer: COMMERCIAL

## 2020-06-25 PROCEDURE — 97140 MANUAL THERAPY 1/> REGIONS: CPT

## 2020-06-25 PROCEDURE — 97112 NEUROMUSCULAR REEDUCATION: CPT

## 2020-06-25 PROCEDURE — 97110 THERAPEUTIC EXERCISES: CPT

## 2020-06-25 NOTE — FLOWSHEET NOTE
[facility]    Physical Therapy Treatment Note/ Progress Report:     Date:  2020    Patient Name:  Ganga Cabello  \"Akhil\"  :  1975  MRN: [id number]  Medical/Treatment Diagnosis Information:  · Diagnosis: Strain of left rotator cuff capsule (S46.012A)  · Treatment Diagnosis: Left shoulder pain   Insurance/Certification information:  PT Insurance Information: Workers Compensation   Physician Information:  Referring Practitioner: Dr. Archibald Schaumann of care signed (Y/N):     Date of Patient follow up with Physician: 20     Progress Report: []  Yes  [x]  No     Functional Scale: QuickDASH = 23% disability  Date: 20    Date Range for reporting period:  Beginnin20  Ending:      Progress report due (10 Rx/or 30 days whichever is less):      Recertification due (POC duration/ or 90 days whichever is less): 20     Visit # Insurance Allowable Auth Needed   4 12 visits approved 6/10/20 - 20 [x]Yes    []No     Pain level:  3/10 at worst     SUBJECTIVE:  Pt reports that he had less difficulty reaching behind him for seat belt last night at work, but still has a little discomfort with this. Pt states that he feels like his shoulder pain is definitely improving and is easier to reach overhead.       OBJECTIVE: See eval   Observation:    Test measurements:      RESTRICTIONS/PRECAUTIONS: L shoulder RTC strain; hx of L thumb sx 2018    Exercises/Interventions:   Therapeutic Ex 25' Wt / Resistance Sets/sec Reps Notes   Pulleys  4'  Flex/abd      Shoulder ER stretch at corner/doorway  10\" 10    Wall slides  5\" 15       S/L ER 2# 2 10    TB rows/ext green 2 10    TB IR/ER green 2 10    Strap IR stretch  10\" 10    Prone rows/ext 2# 2 10    Sleeper stretch  10\" 10                                                            Manual Intervention 12'       Shld /GH Mobs 6'   Inferior & posterior mobs gr III   Post Progress Update:  [] Patient is progressing as expected towards functional goals listed. [] Progression is slowed due to complexities/Impairments listed. [] Progression has been slowed due to co-morbidities. [x] Plan just implemented, too soon to assess goals progression <30days   [] Goals require adjustment due to lack of progress  [] Patient is not progressing as expected and requires additional follow up with physician  [] Other    Prognosis for POC: [x] Good [] Fair  [] Poor    Patient requires continued skilled intervention: [x] Yes  [] No      PLAN: Progress shoulder mobility and RTC/periscapular strength as tolerated by pt. [x] Continue per plan of care [] Alter current plan (see comments)  [] Plan of care initiated [] Hold pending MD visit [] Discharge    Electronically signed by: Ricardo Cabello PT     Note: If patient does not return for scheduled/recommended follow up visits, this note will serve as a discharge from care along with the most recent update on progress.

## 2020-07-02 ENCOUNTER — HOSPITAL ENCOUNTER (OUTPATIENT)
Dept: PHYSICAL THERAPY | Age: 45
Setting detail: THERAPIES SERIES
Discharge: HOME | End: 2020-07-02
Payer: COMMERCIAL

## 2020-07-02 PROCEDURE — 97110 THERAPEUTIC EXERCISES: CPT

## 2020-07-02 PROCEDURE — 97140 MANUAL THERAPY 1/> REGIONS: CPT

## 2020-07-02 PROCEDURE — 97112 NEUROMUSCULAR REEDUCATION: CPT

## 2020-07-02 NOTE — FLOWSHEET NOTE
[employer]    Physical Therapy Treatment Note/ Progress Report:     Date:  2020    Patient Name:  Dylan Hallman  \"Akhil\"  :  1975  MRN: [id number]  Medical/Treatment Diagnosis Information:  · Diagnosis: Strain of left rotator cuff capsule (S46.012A)  · Treatment Diagnosis: Left shoulder pain   Insurance/Certification information:  PT Insurance Information: Workers Compensation   Physician Information:  Referring Practitioner: Dr. Tim Rasheed of care signed (Y/N):     Date of Patient follow up with Physician: 20     Progress Report: []  Yes  [x]  No     Functional Scale: QuickDASH = 23% disability  Date: 20    Date Range for reporting period:  Beginnin20  Ending:      Progress report due (10 Rx/or 30 days whichever is less):      Recertification due (POC duration/ or 90 days whichever is less): 20     Visit # Insurance Allowable Auth Needed   5 12 visits approved 6/10/20 - 20 [x]Yes    []No     Pain level:  0/10 best, 2/10 worst     SUBJECTIVE:  Pt reports that his shoulder continues to make progress every day and feels about 60-70% improvement since starting PT. Pt states that he does not have as much difficulty reaching behind him or overhead, but still has some discomfort on the top of the shoulder. Pt follows up with MD .     OBJECTIVE: See eval   Observation:    Test measurements:      RESTRICTIONS/PRECAUTIONS: L shoulder RTC strain; hx of L thumb sx     Exercises/Interventions:   Therapeutic Ex 25' Wt / Resistance Sets/sec Reps Notes   Pulleys  4'  Flex/abd      Shoulder ER stretch at corner/doorway  10\" 10    Wall slides  HEP      S/L ER 3# 2 10    TB rows/ext green 2 10 ^ NPV   TB IR/ER green 2 10 ^ NPV   Strap IR stretch  10\" 10    Prone rows/ext    Prone HAB 0# 2 10    Sleeper stretch  10\" 10    Supine 90/90 ER green 2 10    Supine D2 green 2 10    Standing scaption at 1/2 wall 0# 1 10                                       Manual Intervention 10'       Shld /GH Mobs 5'   Inferior & posterior mobs gr III   Post Cap mobs       Thoracic/Rib manipulation       CT MT/Mobs       PROM MT 5'      Progress note    NPV          NMR re-education 22'              Body blade  yellow 20\" 4x IR/ER   No monies red 2 10    Supine SA punches 4# 2 10    Ball on wall Red 1 kg ball 1 30 CW/CCW   Ball taps on wall in 90/90 position Green 500 kg  2 10    Wall push ups  2 10 Cues for periscapular activation   Wall scap clocks orange 1 10 B       Therapeutic Exercise and NMR EXR  [x] (39212) Provided verbal/tactile cueing for activities related to strengthening, flexibility, endurance, ROM  for improvements in scapular, scapulothoracic and UE control with self care, reaching, carrying, lifting, house/yardwork, driving/computer work.    [] (73623) Provided verbal/tactile cueing for activities related to improving balance, coordination, kinesthetic sense, posture, motor skill, proprioception  to assist with  scapular, scapulothoracic and UE control with self care, reaching, carrying, lifting, house/yardwork, driving/computer work. Therapeutic Activities:    [] (99984 or 03355) Provided verbal/tactile cueing for activities related to improving balance, coordination, kinesthetic sense, posture, motor skill, proprioception and motor activation to allow for proper function of scapular, scapulothoracic and UE control with self care, carrying, lifting, driving/computer work.      Home Exercise Program:    [x] (47831) Reviewed/Progressed HEP activities related to strengthening, flexibility, endurance, ROM of scapular, scapulothoracic and UE control with self care, reaching, carrying, lifting, house/yardwork, driving/computer work  [] (89394) Reviewed/Progressed HEP activities related to improving balance, coordination, kinesthetic sense, posture, motor skill, proprioception of scapular, scapulothoracic and UE control with self care, reaching, carrying, lifting, house/yardwork, driving/computer work      Manual Treatments:  PROM / STM / Oscillations-Mobs:  G-I, II, III, IV (Ulises, Inf., Post.)  [x] (28770) Provided manual therapy to mobilize soft tissue/joints of cervical/CT, scapular GHJ and UE for the purpose of modulating pain, promoting relaxation,  increasing ROM, reducing/eliminating soft tissue swelling/inflammation/restriction, improving soft tissue extensibility and allowing for proper ROM for normal function with self care, reaching, carrying, lifting, house/yardwork, driving/computer work    Modalities: Declined ice     Charges:  Timed Code Treatment Minutes: 57   Total Treatment Minutes: 57       [] EVAL (LOW) 03562 (typically 20 minutes face-to-face)  [] EVAL (MOD) 83205 (typically 30 minutes face-to-face)  [] EVAL (HIGH) 33069 (typically 45 minutes face-to-face)  [] RE-EVAL     [x] FG(87796) x2     [] KZUBZ (23802)  [x] NMR (90433) x 1    [] VASO (75093)  [x] Manual (51705) x1     [] Other:  [] TA (20082)x     [] Mech Traction (52272)  [] ES(attended) (27858)     [] ES (un) (91112): If BWC Please Indicate Time In/Out  CPT Code Time in Time out        43723 10:00 10:10   50577 10:10 10:35   23301 10:35 10:57                 GOALS:  Patient stated goal: \"To get my strength back and to not have pain\"  []? Progressing: []? Met: []? Not Met: []? Adjusted     Therapist goals for Patient:   Short Term Goals: To be achieved in: 2 weeks  1. Independent in HEP and progression per patient tolerance, in order to prevent re-injury. []? Progressing: []? Met: []? Not Met: []? Adjusted  2. Patient will have a decrease in pain to facilitate improvement in movement, function, and ADLs as indicated by Functional Deficits. []? Progressing: []? Met: []? Not Met: []? Adjusted     Long Term Goals: To be achieved in: 6 weeks  1.  Disability index score of 10% or less for the DASH to assist with reaching prior level of function. []? Progressing: []? Met: []? Not Met: []? Adjusted  2. Patient will demonstrate increased AROM to WNL for L shoulder to allow for proper joint functioning as indicated by patients Functional Deficits. []? Progressing: []? Met: []? Not Met: []? Adjusted  3. Patient will demonstrate an increase in Strength to within 5 lbs of opposite UE without pain to allow for proper functional mobility as indicated by patients Functional Deficits. []? Progressing: []? Met: []? Not Met: []? Adjusted  4. Patient will return to reaching overhead without increased symptoms or restriction. []? Progressing: []? Met: []? Not Met: []? Adjusted  5. Patient will be able to reach behind his back without increased symptoms or restriction. []? Progressing: []? Met: []? Not Met: []? Adjusted    Progression Towards Functional goals:  [] Patient is progressing as expected towards functional goals listed. [] Progression is slowed due to complexities listed. [] Progression has been slowed due to co-morbidities. [x] Plan just implemented, too soon to assess goals progression  [] Other:     ASSESSMENT:  Pt continues to demonstrate improved mobility with GH joint mobs and improved shoulder PROM in all directions with mild stiffness at end range. Pt was fatigued with exercise progressions today. Pt requires cues for appropriate periscapular activation and to decrease UT compensation with progressions today, especially with standing scaption at 1/2 wall. Mild superior shoulder discomfort noted when pt performed standing scaption above about 100 degrees.       Return to Play: (if applicable)   []  Stage 1: Intro to Strength   []  Stage 2: Dynamic Strength and Intro to Plyometrics   []  Stage 3: Advanced Plyometrics and Intro to Throwing   []  Stage 4: Sport specific Training/Return to Sport     []  Ready to Return to Play, Meets All Above Stages   []  Not Ready for Return to Sports   Comments:      Treatment/Activity Tolerance:  [x] Patient tolerated treatment well [] Patient limited by fatique  [] Patient limited by pain  [] Patient limited by other medical complications  [] Other:     Overall Progression Towards Functional goals/ Treatment Progress Update:  [] Patient is progressing as expected towards functional goals listed. [] Progression is slowed due to complexities/Impairments listed. [] Progression has been slowed due to co-morbidities. [x] Plan just implemented, too soon to assess goals progression <30days   [] Goals require adjustment due to lack of progress  [] Patient is not progressing as expected and requires additional follow up with physician  [] Other    Prognosis for POC: [x] Good [] Fair  [] Poor    Patient requires continued skilled intervention: [x] Yes  [] No      PLAN: Progress shoulder mobility and RTC/periscapular strength as tolerated by pt. Pt has follow up with Dr. Mckeon Lines 7/8. [x] Continue per plan of care [] Alter current plan (see comments)  [] Plan of care initiated [] Hold pending MD visit [] Discharge    Electronically signed by: Roge Gooden PT     Note: If patient does not return for scheduled/recommended follow up visits, this note will serve as a discharge from care along with the most recent update on progress.

## 2020-07-07 ENCOUNTER — HOSPITAL ENCOUNTER (OUTPATIENT)
Dept: PHYSICAL THERAPY | Age: 45
Setting detail: THERAPIES SERIES
Discharge: HOME | End: 2020-07-07
Payer: COMMERCIAL

## 2020-07-07 ENCOUNTER — APPOINTMENT (OUTPATIENT)
Dept: PHYSICAL THERAPY | Age: 45
End: 2020-07-07
Payer: COMMERCIAL

## 2020-07-07 PROCEDURE — 97140 MANUAL THERAPY 1/> REGIONS: CPT

## 2020-07-07 PROCEDURE — 97112 NEUROMUSCULAR REEDUCATION: CPT

## 2020-07-07 PROCEDURE — 97110 THERAPEUTIC EXERCISES: CPT

## 2020-07-07 NOTE — PROGRESS NOTES
[employer]     Physical Therapy Re-Certification Plan of Care    Dear Dr. Lopez Hauser,    We had the pleasure of treating the following patient for physical therapy services at [address] [place] [address]. A summary of our findings can be found in the updated assessment below. This includes our plan of care. If you have any questions or concerns regarding these findings, please do not hesitate to contact me at the office phone number checked above. Thank you for the referral.     Physician Signature:________________________________Date:__________________  By signing above (or electronic signature), therapists plan is approved by physician    Date Range Of Visits: 20 - 20  Total Visits to Date: 5  Overall Response to Treatment:   [x]Patient is responding well to treatment and improvement is noted with regards  to goals   []Patient should continue to improve in reasonable time if they continue HEP   []Patient has plateaued and is no longer responding to skilled PT intervention    []Patient is getting worse and would benefit from return to referring MD   []Patient unable to adhere to initial POC   [x]Other: Pt reports having 90% improvement since starting PT. Pt states that his c/c is mild tightness at end range shoulder flexion, but otherwise his shoulder is feeling much better when reaching behind his back and in 90/90 ER position. Pt states that he was able to help get someone out of back seat of his car at work, helping to pull them from left side to right side of his body with no increased pain noted. Pt states that he has not had to forcibly restrain anyone at work, so unsure how that would feel on shoulder.       Physical Therapy Treatment Note/ Progress Report:     Date:  2020    Patient Name:  Rah Hamilton  \"Akhil\"  :  1975  MRN: [id number]  Medical/Treatment Diagnosis Information:  · Diagnosis: Strain Therapeutic Ex 25' Wt / Resistance Sets/sec Reps Notes   Pulleys  4'  Flex/abd      Shoulder ER stretch at corner/doorway  HEP   Wall slides  HEP      S/L ER 3# 2 10    Cable column rows  LPD  Single arm extensions  tricep ext single arm 35#  40#  15#  15# 1 25    TB IR/ER green 2 10 ^ NPV   Strap IR stretch  HEP   Prone rows/ext    Prone HAB  Prone scaption 2#  0# 2  2 10  10    Sleeper stretch  HEP   Supine flexion blue 2 10    Supine 90/90 ER blue 2 10    Supine D2 blue 2 10    Standing scaption at 1/2 wall 0# 1 10    OH rebounder pass 2# MB 1 20                                Manual Intervention 10'       Shld /GH Mobs 5'   Inferior & posterior mobs gr III   Post Cap mobs       Thoracic/Rib manipulation       CT MT/Mobs       PROM MT 5'                    NMR re-education 20'              Body blade  yellow 20\" 4x IR/ER   No monies    Supine SA punches    Ball on wall CW/CCW   Ball taps on wall in 90/90 position Green 500 kg  2 10    Scap activation with foam roller up wall  2 10    Wall push ups  2 10 Cues for periscapular activation   Wall scap clocks orange 1 10 B       Therapeutic Exercise and NMR EXR   [x] (69106) Provided verbal/tactile cueing for activities related to strengthening, flexibility, endurance, ROM  for improvements in scapular, scapulothoracic and UE control with self care, reaching, carrying, lifting, house/yardwork, driving/computer work.    [] (61136) Provided verbal/tactile cueing for activities related to improving balance, coordination, kinesthetic sense, posture, motor skill, proprioception  to assist with  scapular, scapulothoracic and UE control with self care, reaching, carrying, lifting, house/yardwork, driving/computer work.     Therapeutic Activities:    [] (41771 or 21478) Provided verbal/tactile cueing for activities related to improving balance, coordination, kinesthetic sense, posture, motor skill, proprioception and motor activation to allow for proper function of scapular, Term Goals: To be achieved in: 2 weeks  1. Independent in HEP and progression per patient tolerance, in order to prevent re-injury. []? Progressing: [x]? Met: []? Not Met: []? Adjusted  2. Patient will have a decrease in pain to facilitate improvement in movement, function, and ADLs as indicated by Functional Deficits. []? Progressing: [x]? Met: []? Not Met: []? Adjusted     Long Term Goals: To be achieved in: 6 weeks  1. Disability index score of 10% or less for the DASH to assist with reaching prior level of function. []? Progressing: [x]? Met: []? Not Met: []? Adjusted  2. Patient will demonstrate increased AROM to WNL for L shoulder to allow for proper joint functioning as indicated by patients Functional Deficits. []? Progressing: [x]? Met: []? Not Met: []? Adjusted  3. Patient will demonstrate an increase in Strength to within 5 lbs of opposite UE without pain to allow for proper functional mobility as indicated by patients Functional Deficits. [x]? Progressing: []? Met: []? Not Met: []? Adjusted  4. Patient will return to reaching overhead without increased symptoms or restriction. [x]? Progressing: []? Met: []? Not Met: []? Adjusted  5. Patient will be able to reach behind his back without increased symptoms or restriction. []? Progressing: []? Met: []? Not Met: []? Adjusted    Progression Towards Functional goals:  [] Patient is progressing as expected towards functional goals listed. [] Progression is slowed due to complexities listed. [] Progression has been slowed due to co-morbidities. [x] Plan just implemented, too soon to assess goals progression  [] Other:     ASSESSMENT:  Pt demonstrates improved GH joint mobility and improved shoulder AROM overall with mild stiffness noted at end range shoulder flexion ROM. Pt was fatigued with exercise progressions today, but no increased shoulder pain noted.  Pt requires further periscapular strengthening and improved mobility with end range shoulder flexion ROM in order to return to PLOF. Return to Play: (if applicable)   []  Stage 1: Intro to Strength   []  Stage 2: Dynamic Strength and Intro to Plyometrics   []  Stage 3: Advanced Plyometrics and Intro to Throwing   []  Stage 4: Sport specific Training/Return to Sport     []  Ready to Return to Play, [employer]   []  Not Ready for Return to Sports   Comments:      Treatment/Activity Tolerance:  [x] Patient tolerated treatment well [] Patient limited by fatique  [] Patient limited by pain  [] Patient limited by other medical complications  [] Other:     Overall Progression Towards Functional goals/ Treatment Progress Update:  [x] Patient is progressing as expected towards functional goals listed. [] Progression is slowed due to complexities/Impairments listed. [] Progression has been slowed due to co-morbidities. [] Plan just implemented, too soon to assess goals progression <30days   [] Goals require adjustment due to lack of progress  [] Patient is not progressing as expected and requires additional follow up with physician  [] Other    Prognosis for POC: [x] Good [] Fair  [] Poor    Patient requires continued skilled intervention: [x] Yes  [] No      PLAN: Progress shoulder mobility and RTC/periscapular strength as tolerated by pt. Pt has follow up with Dr. Lopez Hauser tomorrow. [x] Continue per plan of care [] Alter current plan (see comments)  [] Plan of care initiated [] Hold pending MD visit [] Discharge    Electronically signed by: Abraham Lagos PT     Note: If patient does not return for scheduled/recommended follow up visits, this note will serve as a discharge from care along with the most recent update on progress.

## 2020-07-08 ENCOUNTER — OFFICE VISIT (OUTPATIENT)
Dept: ORTHOPEDIC SURGERY | Age: 45
End: 2020-07-08
Payer: COMMERCIAL

## 2020-07-08 PROCEDURE — 99213 OFFICE O/P EST LOW 20 MIN: CPT | Performed by: ORTHOPAEDIC SURGERY

## 2020-07-08 NOTE — PROGRESS NOTES
Chief Complaint    Follow-up (left shoulder)      History of Present Illness:  Bree Ford is a 40 y.o. male. Follow-up for left shoulder. Overall doing well at this time. States he no longer has any significant pain within the left shoulder. He is done well with physical therapy       Medical History:  Patient's medications, allergies, past medical, surgical, social and family histories were reviewed and updated as appropriate. Review of Systems:  Pertinent items are noted in HPI  Review of systems reviewed from Patient History Form dated on 7/8/20 and available in the patient's chart under the Media tab. Vital Signs: There were no vitals taken for this visit. General Exam:   Constitutional: Patient is adequately groomed with no evidence of malnutrition  DTRs: Deep tendon reflexes are intact  Mental Status: The patient is oriented to time, place and person. The patient's mood and affect are appropriate. Shoulder Examination:    Inspection: No significant swelling erythema noted but the left shoulder today     Palpation: No tenderness today over the [facility] joint or bicipital groove     Range of Motion: He does have full active range of motion of his left shoulder.     Strength:  He does have good strength with rotator cuff testing in all directions     Special Tests:  Negative Neer and Barnes impingement test.  Negative [facility]'s active compression testing. Negative speeds    Skin: There are no rashes, ulcerations or lesions.     Gait: Normal    Additional Comments:       Additional Examinations:         Right Upper Extremity:  Examination of the right upper extremity does not show any tenderness, deformity or injury. Range of motion is unremarkable. There is no gross instability. There are no rashes, ulcerations or lesions. Strength and tone are normal.        Assessment : Left shoulder rotator cuff strain    Impression:  Encounter Diagnosis   Name Primary?     Strain of left rotator cuff capsule, initial encounter Yes       Office Procedures:  No orders of the defined types were placed in this encounter. Treatment Plan: He is doing much better with the shoulder at this point time. I have no restrictions on his level of activity.   I will see him back if he has any worsening pain or reinjury to the shoulder

## 2020-07-26 NOTE — LETTER
Juan Miguel Delaney is a 88 year old male being seen today for comprehensive and follow up visit visit at [facility].    Code Status: DNR / DNI.   Health Care Power of : Extended Emergency Contact Information  Primary Emergency Contact: Criss Delaney   [facility]  Home Phone: [phone]  Relation: Spouse  Secondary Emergency Contact: Kavita Hassan   [facility]  Home Phone: [phone]  Mobile Phone: [phone]  Relation: Daughter     Allergies: Lisinopril; Penicillins; Rofecoxib; Celecoxib; and Ibuprofen     Chief Complaint / HPI: Follow-up with resident as staff had requested for facility medication review  Noticed incidentally bradycardia on a couple of occasions as low as 44--on average in the 50s appears to be baseline  Reviewed last office visit was documented as 60    Staff report resident had a history of a presyncopal episode with dizziness lightheadedness when he was in the dining room  Staff had resident sit with feet up, drink a couple glasses of water and episode resolved  During chart review noted history of chest pain and hypertension hospitalization January 2019  Sustained elevated troponins, had echo which documented diastolic dysfunction  Had Lexiscan nuclear medicine stress testing which did not show evidence of ischemia  Has been treated on amlodipine 5 mg for going on 2 years which overall seems to be keeping in normotensive range  Denies any chest pain or pressure episodes--does report a history of difficulty with sleep--able to get to sleep but will wake up frequently restless  2 years ago while living in community had become confused left home was found in another location which prompted moved to assisted living facility    Talked with resident about bradycardia--cannot remember details feels that he was told this in the past, does not remember   having any work-up including work-up after his hospitalization in 2019 denies any known history of cardiac problems  Does not  [facility]  [address] [address]  Phone: [phone]  Fax: [phone]    Aliyah Pérez MD        May 15, 2019     Patient: Jeramie Jc   YOB: 1975   Date of Visit: 5/15/2019       To Whom It May Concern: It is my medical opinion that Thomas Seller may return to work on 5/29/2019 full duty . If you have any questions or concerns, please don't hesitate to call.     Sincerely,        Aliyah Pérez MD remember having any event monitoring follow-up    Reports he does like to work out in the fitness center at the assisted living facility will do 100 sit ups a day and likes to do walking in the hallways--- retired from [employer] and enjoys physical exercise  Resides with his wife who is also very hard of hearing                      Past Medical, Surgical, Family and Social History reviewed: YES.     Medications: reviewed and reconciled  Medications - recent changes: none    Review of Systems:  General: negative  Skin: negative  Resp: negative  CV: positive for syncope or near-syncope and bradycardia  Musculoskeletal: negative  Psychiatric: positive for memory loss    Toileting:    Continent of Bowel: Yes   Continent of Bladder: Yes  Mobility: ambulatory    Recent Labs: None      Current Therapies: none    Exam:  Vital signs reviewed.   GENERAL APPEARANCE:  alert and no distress  EYES: Eyes grossly normal to inspection  HENT: ear canals and TM's normal, hard of hearing  NECK: no adenopathy, no asymmetry  RESP: lungs clear   CV: Bradycardic normal S1 S2, no S3 or S4 click or rub, no peripheral edema and peripheral pulses strong  MS: no musculoskeletal defects are noted and gait is age appropriate without ataxia  SKIN: no suspicious lesions or rashes  PSYCH: mentation appears normal and affect normal/bright    Assessment and Plan:    Talked with resident about obtaining labs which she is due for--would include TSH, kidney function for chronic kidney disease  Updated vitamin D and B12    Also discussed 1 week ZIO patch event monitor for bradycardia to determine any occult dysrhythmias or conduction abnormalities--- possibly related to sleep disorder as appears he has had chronic issues with insomnia for years-- he is not on any medications that would contribute  Has never been on a beta-blocker  He is interested in completing this especially with the presyncopal episode he had at the facility at dinnertime about a  month ago--- he denies having any episodes since    We will complete lab work next week at facility lab day--refill medications as indicated  Will bring and apply ZIO Patch during rounds next week as patient requests  Follow-up as indicated    Overall for age and state of health he is in good physical condition        (R00.1) Bradycardia  (primary encounter diagnosis)    (G30.1,  F02.80) Late onset Alzheimer's disease without behavioral disturbance (H)      (I10) Benign essential hypertension      (I51.9) Grade II diastolic dysfunction  Comment: per ECHO 1/2019      (R42,  R55) Postural dizziness with presyncope      (Z79.899) Encounter for medication review    (N18.3) CKD (chronic kidney disease) stage 3, GFR 30-59 ml/min (H)

## 2020-09-10 ENCOUNTER — TELEPHONE (OUTPATIENT)
Dept: ORTHOPEDIC SURGERY | Age: 45
End: 2020-09-10

## 2020-09-10 NOTE — TELEPHONE ENCOUNTER
FAXED [facility] THE [facility] )  07/08/2020 TO Lorne Terry @ [address] @ [phone]

## 2020-09-17 ENCOUNTER — OFFICE VISIT (OUTPATIENT)
Dept: ORTHOPEDIC SURGERY | Age: 45
End: 2020-09-17
Payer: COMMERCIAL

## 2020-09-17 VITALS — WEIGHT: 238 LBS | TEMPERATURE: 97.9 F | BODY MASS INDEX: 32.23 KG/M2 | HEIGHT: 72 IN

## 2020-09-17 PROCEDURE — 99214 OFFICE O/P EST MOD 30 MIN: CPT | Performed by: ORTHOPAEDIC SURGERY

## 2020-09-17 NOTE — PROGRESS NOTES
Present Illness:  Buster Arreguin is a 39 y.o. male recheck evaluation left shoulder overall he is doing well with this injury he is done very well with physical therapy getting this stronger and better proprioception and range of motion    Chief complaint presents in the office today: Left shoulder mostly completely resolved    Location left shoulder  Severity  Moderate  Duration about 3 months now  Associated sign/symptoms he is doing well he is much less pain is got full function    I have reviewed and discussed the below Pain assessment findings with the patient. Medical History  Patient's medications, allergies, past medical, surgical, social and family histories were reviewed and updated as appropriate. Past Medical History:   Diagnosis Date    Hypertension      No family history on file.   Social History     Socioeconomic History    Marital status:      Spouse name: None    Number of children: None    Years of education: None    Highest education level: None   Occupational History    None   Social Needs    Financial resource strain: None    Food insecurity     Worry: None     Inability: None    Transportation needs     Medical: None     Non-medical: None   Tobacco Use    Smoking status: Never Smoker    Smokeless tobacco: Former User   Substance and Sexual Activity    Alcohol use: Never     Frequency: Never    Drug use: Never    Sexual activity: None   Lifestyle    Physical activity     Days per week: None     Minutes per session: None    Stress: None   Relationships    Social connections     Talks on phone: None     Gets together: None     Attends [employer] service: None     Active member of club or organization: None     Attends meetings of clubs or organizations: None     Relationship status: None    Intimate partner violence     Fear of current or ex partner: None     Emotionally abused: None     Physically abused: None     Forced sexual activity: None   Other Topics Concern    None   Social History Narrative    None     Current Outpatient Medications   Medication Sig Dispense Refill    metoprolol succinate (TOPROL XL) 50 MG extended release tablet Take 50 mg by mouth daily       No current facility-administered medications for this visit. Allergies   Allergen Reactions    Bee Venom Anaphylaxis    Other Anaphylaxis     beer    Iodine Swelling    Povidone Iodine Rash       REVIEW OF SYSTEMS:   No rash  No numbness  No tingling  No fever  No depression      Pertinent items are noted in HPI  Review of systems reviewed from Patient History Form dated on 6/8/2020 and available in the patient's chart under the Media tab. Examination:    General Exam:    Vitals: Temperature 97.9 °F (36.6 °C), height 6' (1.829 m), weight 238 lb (108 kg). BMI Readings from Last 3 Encounters:   09/17/20 32.28 kg/m²   06/08/20 32.28 kg/m²   03/19/19 32.55 kg/m²     Constitutional: Patient is adequately groomed with no evidence of malnutrition  Mental Status: The patient is oriented to time, place and person. The patient's mood and affect are appropriate. Lymphatic: The lymphatic examination bilaterally reveals all areas to be without enlargement or induration. Vascular: Examination reveals no swelling or calf tenderness. Peripheral pulses are palpable and 2+. Neurological: The patient has good coordination. There is no weakness or sensory deficit. Skin:    Head/Neck: inspection reveals no rashes, ulcerations or lesions. Trunk: inspection reveals no rashes, ulcerations or lesions. Right Upper Extremity: inspection reveals no rashes, ulcerations or lesions. Left Upper Extremity: inspection reveals no rashes, ulcerations or lesions. PHYSICAL EXAM:      Shoulder Examination  Inspection:  No obvious deformity, no erythema, no abrasions or lacerations, no obvious muscle atrophy.       Palpation:  Lateral deltoid no pain to palpation  [facility] joint no pain to palpation  No pain Anterior to palpation  No pain Posterior to palpation  No trapezial pain to palpation  Range of Motion:  Abduction --150 degrees  Flexion-- 180 degrees  Extension-- between 45-60 degrees  Latera/external rotation --close to 90 degrees  Medial/ internal rotation -- between 70-90 degrees    Strength: Left shoulder strength:   internal rotation against resistance is 5/5  external rotation against resistance is 5/5  and supraspinatus isolation against resistance is 5/5, Shoulder shrug is 5 over 5 , cervical spine strength is excellent, flexion extension at the elbow is 5 over 5, wrist and hand strength is equal bilaterally with supination pronation and flexion and extension  no winging no muscle atrophy. Special Tests:  Palpation demonstrates no swelling no effusion no pain. There is full active and passive range of motion. Strength is excellent with internal rotation against resistance external rotation against resistance supraspinatus isolation against resistance. Shoulder shrug strength is 5 over 5 equal bilaterally. Radial ulnar and median nerve function is intact. Capillary refill is brisk. Sensation is intact from neck down to the fingers. Elbow motion finger and wrist motion is full equal bilaterally. Deep tendon reflexes of the Brachial radialis, biceps, triceps are all +2/4 equal bilaterally. Cervical spine range of motion is full without pain negative Spurling's test.  Load-and-shift test is negative. Crank test is negative. Apprehension and relocation are negative. Anterior and posterior glide are equal bilaterally. Negative sulcus sign. No signs of any significant multidirectional instability. There is no scapular winging. There is no muscle atrophy of the latissimus dorsi, the deltoid, the periscapular musculature, the trapezius musculature or the pectoralis musculature.   Positive Neer's test, positive Barnes test, positive pain with crossarm elevation. Gait: normal gait     Reflex: Deep tendon reflexes of the biceps, triceps, brachioradialis +2/4 equal bilaterally. Lower extremity reflexes:  +2/4 and equal bilaterally for patella and Achilles. Contralateral Shoulder exam: Palpation demonstrates no swelling no effusion no pain. There is full active and passive range of motion bilaterally. Strength is excellent with internal rotation against resistance external rotation against resistance supraspinatus isolation against resistance. Shoulder shrug strength is 5 over 5 equal bilaterally. Radial ulnar and median nerve function is intact. Capillary refill is brisk. Elbow motion finger and wrist motion is full equal bilaterally. Deep tendon reflexes of the Brachial radialis, biceps, triceps are all +2/4 equal bilaterally. Cervical spine range of motion is full without pain negative Spurling's test.  Load-and-shift test is negative. Crank test is negative. Apprehension and relocation are negative. Anterior and posterior glide are equal bilaterally. Negative sulcus sign. No signs of any significant multidirectional instability. There is no scapular winging. There is no muscle atrophy of the latissimus dorsi, the deltoid, the periscapular musculature, the trapezius musculature or the pectoralis musculature. Negative Neer's test, negative Barnes test, no pain with crossarm elevation. Abduction --150 degrees  Flexion-- 180 degrees  Extension-- between 45-60 degrees  Latera/external rotation --close to 90 degrees  Medial/ internal rotation -- between 70-90 degree    CERVICAL SPINE  EXAMINATION:  · Inspection: Local inspection shows no step-off or bruising. Cervical alignment is normal. No instability is noted. · Palpation and Percussion: No evidence of tenderness at the midline. Paraspinal tenderness is not present. There is no paraspinal spasm.   · Range of Motion:  pain-free ROM   · Strength: 5/5 bilateral upper extremities. · Special Tests:   Spurling's and Cuevas's are negative bilaterally. Barnes and Impingement tests are negative bilaterally. · Skin:There are no rashes, ulcerations or lesions. · Reflexes: Bilaterally triceps, biceps and brachioradialis are 2+. Clonus absent bilaterally at the feet. No pathological reflexes are noted. · Gait & station:  normal, patient ambulates without assistance and no ataxia      Cranial nerve exam:    1- smell-- patient states no Olfactory problem  2- visual acuity is intact  3- moves eyes, and pupils are reactive  4- extra-ocular muscles are intact  5- facial sensation is intact no muscle atrophy  6- extra ocular muscles are intact  7- mouth moist and facial expressions are intact  8- good hearing and no difficulty with recognition  9- patient has no difficulty swallowing  10- no difficulty breathing and no Gastrointestinal problems good cough   11- moves head with all motion and no swallowing problems  12- normal speech and tongue protrudes midline    Additional Examinations:  Right Lower Extremity: Examination of the right lower extremity does not show any tenderness, deformity or injury. Range of motion is unremarkable. There is no gross instability. There are no rashes, ulcerations or lesions. Strength and tone are normal.  Left Lower Extremity: Examination of the left lower extremity does not show any tenderness, deformity or injury. Range of motion is unremarkable. There is no gross instability. There are no rashes, ulcerations or lesions. Strength and tone are normal.  Lower Back: Examination of the lower back does not show any tenderness, deformity or injury. Range of motion is unremarkable. There is no gross instability. There are no rashes, ulcerations or lesions. Strength and tone are normal.        IMPRESSION:    Diagnostic testing:  X-rays reviewed in office by myself, I personally and independently reviewed the films in the office today :  I reviewed multiple X-rays today of the left shoulder:  Anterior posterior, lateral, axillary:  Show no fracture, no dislocation, no signs of any masses or tumors, no significant glenohumeral arthritis, no significant A.C. Joint arthritis, good joint space maintenance,    Impression no significant bony abnormality    Labs:None at this time. No results found. Past Surgical History:   Procedure Laterality Date    ANKLE SURGERY      right and left    HAND TENDON SURGERY Left 3/19/2019    OPERATIVE REPAIR OF LEFT THUMB ULNAR COLLATERAL LIGAMENT performed by Gildardo Smith MD at [address] [place] [address] Office Procedures:  No orders of the defined types were placed in this encounter. Previous Treatments: Physical therapy, X-ray, anti-inflammatories,    Differential Diagnoses: Impingement, AC joint osteoarthritis, Rotator cuff tear, Labral tear, Instability, loose body, long head of bicep injury, glenohumeral osteoarthritis, AC joint separation, SLAP tear, Posterior labral tear, Anterior Labral tear, neck pathology, brachioplexis injury, muscle injury, neck radiculopathy, bone tumor, fracture or stress fracture. Diagnosis left shoulder strain/sprain      Plan (Medical Decision Making):    I discussed the diagnosis and the treatment options with Jesusisai Quirogas today. In Summary:  The various treatment options were outlined and discussed with Holly Bazan including:  Conservative care options: physical therapy, ice, medications, bracing, and activity modification. The indications for therapeutic injections. The indications for additional imaging/laboratory studies. The indications for (possible future) interventions. After considering the various options discussed, Holly Bazan elected to pursue a course of treatment that includes the followin. Medications: No further recommendations for new medications.     2. PT:  Encouraged to continue with Home exercise program.    3. Further studies: No further studies. 4. Interventional: We have discussed options including physical therapy, chiropractic care, observation. Risks benefits and side effects were all discussed with the patient. They verbalized understanding would like to proceed. 5. Healthy Lifestyle Measures:  Patient education material reviewing the following was distributed to [employer] [facility]  Anatomic drawings  Healthy lifestyle education  Osteoporosis prevention,   Back and neck pain educational information   Advanced imaging preparedness    Posture education   Proper lifting and carrying techniques,  Weight management discussed  For further information regarding the spine conditions and to review interventional treatments the patient was directed to [employer]    6.  Follow up:  as needed    ALYSON Enriquez was instructed to call the office if his symptoms worsen or if new symptoms appear prior to the next scheduled visit. He is specifically instructed to contact the office between now & his scheduled appointment if he has concerns related to his condition or if he needs assistance in scheduling the above tests. He is   welcome to call for an appointment sooner if he has any additional concerns or questions. Marleen Danielson D.O. [address] [place] [address] and Sports Medicine  Sports [facility] Tra[facility]  Board Certified  [employer] Team Physician      Disclaimer: \"This note was dictated with voice recognition software. Though review and correction are routine, we apologize for any errors. \"

## 2022-08-26 NOTE — LETTER
[facility]  [address] [address]  Phone: [phone]  Fax: [phone]    Lan Garcia MD        April 17, 2019     Patient: Dominik Pat   YOB: 1975   Date of Visit: 4/17/2019       To Whom It May Concern:    Sharon Garcia was seen today 4/17/19 in my office. He is to continue with one handed duty for 4 weeks. If you have any questions or concerns, please don't hesitate to call.     Sincerely,      Yane Mitchell MD
97.6

## (undated) DEVICE — DRAPE HND W114XL142IN BLU POLYPR W O PCH FEN CRD AND TB HLDR

## (undated) DEVICE — DRAPE,U/ SHT,SPLIT,PLAS,STERIL: Brand: MEDLINE

## (undated) DEVICE — STERILE VELCLOSE ELASTIC BANDAGE, 2IN: Brand: VELCLOSE

## (undated) DEVICE — STERILE VELCLOSE ELASTIC BANDAGE, 4IN: Brand: VELCLOSE

## (undated) DEVICE — BLADE OPHTH 180DEG CUT SURF BLU STR SHRP DBL BVL GRINDLESS

## (undated) DEVICE — LIGHT HANDLE: Brand: DEVON

## (undated) DEVICE — SUTURE FIBERWIRE SZ 4-0 L18IN NONABSORBABLE BLU L18.7MM 3/8 AR7228

## (undated) DEVICE — SUTURE FIBERWIRE 3-0 L18IN NONABSORBABLE BLU L15MM 3/8 CIR AR722701

## (undated) DEVICE — DRAPE CARM MINI FOR IMAG SYS INSIGHT FLROSCN

## (undated) DEVICE — PACK PROCEDURE SURG EXTREMITY MFFOP CUST

## (undated) DEVICE — PADDING CAST W4INXL4YD ST COT RAYON MICROPLEATED HIGHLY

## (undated) DEVICE — HYPODERMIC SAFETY NEEDLE: Brand: MAGELLAN

## (undated) DEVICE — 1010 S-DRAPE TOWEL DRAPE 10/BX: Brand: STERI-DRAPE™

## (undated) DEVICE — BANDAGE GZ W2XL75IN ST RAYON POLY CNFRM STRTCH LTWT

## (undated) DEVICE — TOWEL,OR,DSP,ST,BLUE,STD,4/PK,20PK/CS: Brand: MEDLINE

## (undated) DEVICE — SUTURE FIBERWIRE SZ 3-0 L18IN BLU L16.3MM 3/8 CIR REV CUT AR722702

## (undated) DEVICE — 3M™ WARMING BLANKET, LOWER BODY, 10 PER CASE, 42568: Brand: BAIR HUGGER™

## (undated) DEVICE — ESMARK: Brand: DEROYAL

## (undated) DEVICE — ROOM TURNOVER KIT W/ ARM STRP

## (undated) DEVICE — GAUZE,SPONGE,4"X4",8PLY,STRL,LF,10/TRAY: Brand: MEDLINE

## (undated) DEVICE — SUTURE ETHLN SZ 4-0 L18IN NONABSORBABLE BLK L19MM PS-2 3/8 1667H

## (undated) DEVICE — SUTURE ETHIB D 4-0 24 IN TF DA GRN X204H

## (undated) DEVICE — GLOVE SURG SZ 8 L12IN FNGR THK79MIL GRN LTX FREE

## (undated) DEVICE — STRETCH BANDAGE ROLL: Brand: DERMACEA

## (undated) DEVICE — ZIMMER® STERILE DISPOSABLE TOURNIQUET CUFF WITH PLC, DUAL PORT, SINGLE BLADDER, 18 IN. (46 CM)

## (undated) DEVICE — SOLUTION IV IRRIG 500ML 0.9% SODIUM CHL 2F7123

## (undated) DEVICE — INTENDED FOR TISSUE SEPARATION, AND OTHER PROCEDURES THAT REQUIRE A SHARP SURGICAL BLADE TO PUNCTURE OR CUT.: Brand: BARD-PARKER ® STAINLESS STEEL BLADES

## (undated) DEVICE — MEDICINE CUP, GRADUATED, STER: Brand: MEDLINE

## (undated) DEVICE — SUTURE VCRL SZ 3-0 L27IN ABSRB UD L26MM SH 1/2 CIR J416H

## (undated) DEVICE — GLOVE ORANGE PI 7 1/2   MSG9075

## (undated) DEVICE — SHEET,DRAPE,53X77,STERILE: Brand: MEDLINE

## (undated) DEVICE — DRESSING PETRO W3XL3IN OIL EMUL N ADH GZ KNIT IMPREG CELOS

## (undated) DEVICE — SUTURE ABSORBABLE BRAIDED 4-0 P-3 18 IN UD VICRYL J494G